# Patient Record
Sex: MALE | Race: BLACK OR AFRICAN AMERICAN | Employment: UNEMPLOYED | URBAN - NONMETROPOLITAN AREA
[De-identification: names, ages, dates, MRNs, and addresses within clinical notes are randomized per-mention and may not be internally consistent; named-entity substitution may affect disease eponyms.]

---

## 2020-08-15 ENCOUNTER — HOSPITAL ENCOUNTER (EMERGENCY)
Age: 25
Discharge: LEFT BEFORE DISCHARGE | End: 2020-08-15
Payer: MEDICAID

## 2022-01-01 ENCOUNTER — APPOINTMENT (OUTPATIENT)
Dept: CT IMAGING | Facility: HOSPITAL | Age: 27
End: 2022-01-01

## 2022-01-01 ENCOUNTER — HOSPITAL ENCOUNTER (EMERGENCY)
Facility: HOSPITAL | Age: 27
Discharge: HOME | End: 2022-01-01
Attending: EMERGENCY MEDICINE | Admitting: EMERGENCY MEDICINE

## 2022-01-01 ENCOUNTER — APPOINTMENT (OUTPATIENT)
Dept: MRI IMAGING | Facility: HOSPITAL | Age: 27
End: 2022-01-01

## 2022-01-01 VITALS
RESPIRATION RATE: 18 BRPM | HEIGHT: 72 IN | TEMPERATURE: 98.8 F | OXYGEN SATURATION: 100 % | WEIGHT: 160 LBS | DIASTOLIC BLOOD PRESSURE: 83 MMHG | SYSTOLIC BLOOD PRESSURE: 114 MMHG | BODY MASS INDEX: 21.67 KG/M2 | HEART RATE: 78 BPM

## 2022-01-01 DIAGNOSIS — R40.4 TRANSIENT ALTERATION OF AWARENESS: Primary | ICD-10-CM

## 2022-01-01 LAB
ALBUMIN SERPL-MCNC: 4.7 G/DL (ref 3.5–5.2)
ALBUMIN/GLOB SERPL: 1.3 G/DL
ALP SERPL-CCNC: 68 U/L (ref 39–117)
ALT SERPL W P-5'-P-CCNC: 21 U/L (ref 1–41)
AMPHET+METHAMPHET UR QL: NEGATIVE
AMPHETAMINES UR QL: NEGATIVE
ANION GAP SERPL CALCULATED.3IONS-SCNC: 14 MMOL/L (ref 5–15)
APAP SERPL-MCNC: <5 MCG/ML (ref 0–30)
AST SERPL-CCNC: 39 U/L (ref 1–40)
BARBITURATES UR QL SCN: NEGATIVE
BASOPHILS # BLD AUTO: 0.03 10*3/MM3 (ref 0–0.2)
BASOPHILS NFR BLD AUTO: 0.4 % (ref 0–1.5)
BENZODIAZ UR QL SCN: POSITIVE
BILIRUB SERPL-MCNC: 0.9 MG/DL (ref 0–1.2)
BUN SERPL-MCNC: 9 MG/DL (ref 6–20)
BUN/CREAT SERPL: 9.5 (ref 7–25)
BUPRENORPHINE SERPL-MCNC: NEGATIVE NG/ML
CALCIUM SPEC-SCNC: 9.5 MG/DL (ref 8.6–10.5)
CANNABINOIDS SERPL QL: NEGATIVE
CHLORIDE SERPL-SCNC: 102 MMOL/L (ref 98–107)
CO2 SERPL-SCNC: 24 MMOL/L (ref 22–29)
COCAINE UR QL: NEGATIVE
CREAT SERPL-MCNC: 0.95 MG/DL (ref 0.76–1.27)
DEPRECATED RDW RBC AUTO: 40.4 FL (ref 37–54)
EOSINOPHIL # BLD AUTO: 0.14 10*3/MM3 (ref 0–0.4)
EOSINOPHIL NFR BLD AUTO: 2 % (ref 0.3–6.2)
ERYTHROCYTE [DISTWIDTH] IN BLOOD BY AUTOMATED COUNT: 13.9 % (ref 12.3–15.4)
ETHANOL UR QL: <0.01 %
GFR SERPL CREATININE-BSD FRML MDRD: 115 ML/MIN/1.73
GLOBULIN UR ELPH-MCNC: 3.5 GM/DL
GLUCOSE SERPL-MCNC: 85 MG/DL (ref 65–99)
HCT VFR BLD AUTO: 40.6 % (ref 37.5–51)
HGB BLD-MCNC: 14.2 G/DL (ref 13–17.7)
HOLD SPECIMEN: NORMAL
IMM GRANULOCYTES # BLD AUTO: 0.01 10*3/MM3 (ref 0–0.05)
IMM GRANULOCYTES NFR BLD AUTO: 0.1 % (ref 0–0.5)
LYMPHOCYTES # BLD AUTO: 2.19 10*3/MM3 (ref 0.7–3.1)
LYMPHOCYTES NFR BLD AUTO: 31.7 % (ref 19.6–45.3)
MAGNESIUM SERPL-MCNC: 2.2 MG/DL (ref 1.6–2.6)
MCH RBC QN AUTO: 28.5 PG (ref 26.6–33)
MCHC RBC AUTO-ENTMCNC: 35 G/DL (ref 31.5–35.7)
MCV RBC AUTO: 81.4 FL (ref 79–97)
METHADONE UR QL SCN: NEGATIVE
MONOCYTES # BLD AUTO: 0.95 10*3/MM3 (ref 0.1–0.9)
MONOCYTES NFR BLD AUTO: 13.8 % (ref 5–12)
NEUTROPHILS NFR BLD AUTO: 3.58 10*3/MM3 (ref 1.7–7)
NEUTROPHILS NFR BLD AUTO: 52 % (ref 42.7–76)
NRBC BLD AUTO-RTO: 0 /100 WBC (ref 0–0.2)
OPIATES UR QL: NEGATIVE
OXYCODONE UR QL SCN: NEGATIVE
PCP UR QL SCN: NEGATIVE
PLATELET # BLD AUTO: 245 10*3/MM3 (ref 140–450)
PMV BLD AUTO: 9.3 FL (ref 6–12)
POTASSIUM SERPL-SCNC: 4.6 MMOL/L (ref 3.5–5.2)
PROPOXYPH UR QL: NEGATIVE
PROT SERPL-MCNC: 8.2 G/DL (ref 6–8.5)
RBC # BLD AUTO: 4.99 10*6/MM3 (ref 4.14–5.8)
SALICYLATES SERPL-MCNC: <0.3 MG/DL
SARS-COV-2 RNA PNL SPEC NAA+PROBE: NOT DETECTED
SODIUM SERPL-SCNC: 140 MMOL/L (ref 136–145)
TRICYCLICS UR QL SCN: NEGATIVE
WBC NRBC COR # BLD: 6.9 10*3/MM3 (ref 3.4–10.8)
WHOLE BLOOD HOLD SPECIMEN: NORMAL
WHOLE BLOOD HOLD SPECIMEN: NORMAL

## 2022-01-01 PROCEDURE — 0 GADOBENATE DIMEGLUMINE 529 MG/ML SOLUTION: Performed by: EMERGENCY MEDICINE

## 2022-01-01 PROCEDURE — 93010 ELECTROCARDIOGRAM REPORT: CPT | Performed by: INTERNAL MEDICINE

## 2022-01-01 PROCEDURE — 96374 THER/PROPH/DIAG INJ IV PUSH: CPT

## 2022-01-01 PROCEDURE — 36415 COLL VENOUS BLD VENIPUNCTURE: CPT

## 2022-01-01 PROCEDURE — 82077 ASSAY SPEC XCP UR&BREATH IA: CPT | Performed by: EMERGENCY MEDICINE

## 2022-01-01 PROCEDURE — 83735 ASSAY OF MAGNESIUM: CPT | Performed by: EMERGENCY MEDICINE

## 2022-01-01 PROCEDURE — 25010000002 LORAZEPAM PER 2 MG: Performed by: EMERGENCY MEDICINE

## 2022-01-01 PROCEDURE — 70553 MRI BRAIN STEM W/O & W/DYE: CPT

## 2022-01-01 PROCEDURE — 93005 ELECTROCARDIOGRAM TRACING: CPT | Performed by: EMERGENCY MEDICINE

## 2022-01-01 PROCEDURE — 80053 COMPREHEN METABOLIC PANEL: CPT | Performed by: EMERGENCY MEDICINE

## 2022-01-01 PROCEDURE — 80306 DRUG TEST PRSMV INSTRMNT: CPT | Performed by: EMERGENCY MEDICINE

## 2022-01-01 PROCEDURE — 85025 COMPLETE CBC W/AUTO DIFF WBC: CPT | Performed by: EMERGENCY MEDICINE

## 2022-01-01 PROCEDURE — 70450 CT HEAD/BRAIN W/O DYE: CPT

## 2022-01-01 PROCEDURE — 80143 DRUG ASSAY ACETAMINOPHEN: CPT | Performed by: EMERGENCY MEDICINE

## 2022-01-01 PROCEDURE — A9577 INJ MULTIHANCE: HCPCS | Performed by: EMERGENCY MEDICINE

## 2022-01-01 PROCEDURE — 99284 EMERGENCY DEPT VISIT MOD MDM: CPT

## 2022-01-01 PROCEDURE — 80179 DRUG ASSAY SALICYLATE: CPT | Performed by: EMERGENCY MEDICINE

## 2022-01-01 PROCEDURE — 87635 SARS-COV-2 COVID-19 AMP PRB: CPT | Performed by: EMERGENCY MEDICINE

## 2022-01-01 RX ORDER — SODIUM CHLORIDE 0.9 % (FLUSH) 0.9 %
10 SYRINGE (ML) INJECTION AS NEEDED
Status: DISCONTINUED | OUTPATIENT
Start: 2022-01-01 | End: 2022-01-02 | Stop reason: HOSPADM

## 2022-01-01 RX ORDER — LORAZEPAM 2 MG/ML
1 INJECTION INTRAMUSCULAR ONCE
Status: COMPLETED | OUTPATIENT
Start: 2022-01-01 | End: 2022-01-01

## 2022-01-01 RX ADMIN — LORAZEPAM 1 MG: 2 INJECTION INTRAMUSCULAR; INTRAVENOUS at 20:51

## 2022-01-01 RX ADMIN — GADOBENATE DIMEGLUMINE 14 ML: 529 INJECTION, SOLUTION INTRAVENOUS at 21:39

## 2022-01-02 NOTE — ED PROVIDER NOTES
"Subjective   Patient is brought to emergency room by ambulance with a report from the EMS crew that they were called at the scene by his mother and grandmother with a report that the patient was unresponsive but breathing.  That is what EMS found there.  They do not know how he got this way underwent on how long he was like that.  The mother and grandmother told him that he may have taken some ambulate no him taking anything.  They said the patient responds to them with painful stimuli by briefly opening his eyes and sometimes \"lock up\" but they have not actually seen any seizures.  The family also told EMS that patient has had a panic attack in the past.      History provided by:  Patient   used: No    Altered Mental Status  Presenting symptoms: unresponsiveness    Severity:  Severe  Most recent episode:  Today  Episode history:  Single  Duration: uncertain.  Timing:  Constant  Progression:  Partially resolved  Chronicity:  New  Context: drug use    Context: not alcohol use, not dementia, not head injury, not homeless, taking medications as prescribed, not nursing home resident, not recent change in medication, not recent illness and not recent infection    Associated symptoms: abnormal movement    Associated symptoms: no abdominal pain, no agitation, no bladder incontinence, no decreased appetite, no depression, no difficulty breathing, no eye deviation, no fever, no hallucinations, no headaches, no light-headedness, no nausea, no palpitations, no rash, no seizures, no slurred speech, no suicidal behavior, no visual change, no vomiting and no weakness        Review of Systems   Unable to perform ROS: Mental status change   Constitutional: Negative for decreased appetite and fever.   Cardiovascular: Negative for palpitations.   Gastrointestinal: Negative for abdominal pain, nausea and vomiting.   Genitourinary: Negative for bladder incontinence.   Skin: Negative for rash.   Neurological: " Negative for seizures, weakness, light-headedness and headaches.   Psychiatric/Behavioral: Negative for agitation and hallucinations.   All other systems reviewed and are negative.      History reviewed. No pertinent past medical history.    No Known Allergies    History reviewed. No pertinent surgical history.    History reviewed. No pertinent family history.    Social History     Socioeconomic History   • Marital status: Single       Prior to Admission medications    Not on File       Medications   sodium chloride 0.9 % flush 10 mL (has no administration in time range)   LORazepam (ATIVAN) injection 1 mg (1 mg Intravenous Given 1/1/22 2051)   gadobenate dimeglumine (MULTIHANCE) injection 14 mL (14 mL Intravenous Given 1/1/22 2139)       Vitals:    01/01/22 2316   BP: 114/83   Pulse: 78   Resp: 18   Temp: 98.8 °F (37.1 °C)   SpO2: 100%         Objective   Physical Exam  Vitals and nursing note reviewed.   Constitutional:       Appearance: Normal appearance.   HENT:      Head: Normocephalic and atraumatic.      Right Ear: Tympanic membrane normal.      Left Ear: Tympanic membrane normal.      Nose: Nose normal.      Mouth/Throat:      Pharynx: Oropharynx is clear.      Comments: Patient does have an apparent burn blister on the right side of his lower lip.  Eyes:      Extraocular Movements: Extraocular movements intact.      Pupils: Pupils are equal, round, and reactive to light.   Cardiovascular:      Rate and Rhythm: Normal rate and regular rhythm.   Pulmonary:      Effort: Pulmonary effort is normal.      Breath sounds: Normal breath sounds.   Abdominal:      General: Abdomen is flat.      Palpations: Abdomen is soft.   Musculoskeletal:         General: Normal range of motion.      Cervical back: Normal range of motion and neck supple.   Skin:     General: Skin is warm and dry.      Capillary Refill: Capillary refill takes less than 2 seconds.   Neurological:      General: No focal deficit present.      Comments:  Patient arouses with a sternal rub and will look at she will not answer any questions.  He has no focal limitations or deficits.   Psychiatric:      Comments: Unresponsive to questioning.         Procedures         Lab Results (last 24 hours)     Procedure Component Value Units Date/Time    Acetaminophen Level [[id number]]  (Normal) Collected: 01/01/22 1851    Specimen: Blood Updated: 01/01/22 1946     Acetaminophen <5.0 mcg/mL     CBC & Differential [[id number]]  (Abnormal) Collected: 01/01/22 1851    Specimen: Blood Updated: 01/01/22 1909    Narrative:      The following orders were created for panel order CBC & Differential.  Procedure                               Abnormality         Status                     ---------                               -----------         ------                     CBC Auto Differential[[id number]]        Abnormal            Final result                 Please view results for these tests on the individual orders.    Comprehensive Metabolic Panel [[id number]] Collected: 01/01/22 1851    Specimen: Blood Updated: 01/01/22 1942     Glucose 85 mg/dL      BUN 9 mg/dL      Creatinine 0.95 mg/dL      Sodium 140 mmol/L      Potassium 4.6 mmol/L      Chloride 102 mmol/L      CO2 24.0 mmol/L      Calcium 9.5 mg/dL      Total Protein 8.2 g/dL      Albumin 4.70 g/dL      ALT (SGPT) 21 U/L      AST (SGOT) 39 U/L      Alkaline Phosphatase 68 U/L      Total Bilirubin 0.9 mg/dL      eGFR  African Amer 115 mL/min/1.73      Globulin 3.5 gm/dL      A/G Ratio 1.3 g/dL      BUN/Creatinine Ratio 9.5     Anion Gap 14.0 mmol/L     Narrative:      GFR Normal >60  Chronic Kidney Disease <60  Kidney Failure <15      Magnesium [[id number]]  (Normal) Collected: 01/01/22 1851    Specimen: Blood Updated: 01/01/22 1930     Magnesium 2.2 mg/dL     Ethanol [[id number]] Collected: 01/01/22 1851    Specimen: Blood Updated: 01/01/22 1920     Ethanol % <0.010 %     Narrative:      Not for legal purposes. Chain of Custody not  followed.     Salicylate Level [[id number]]  (Normal) Collected: 01/01/22 1851    Specimen: Blood Updated: 01/01/22 1946     Salicylate <0.3 mg/dL     CBC Auto Differential [[id number]]  (Abnormal) Collected: 01/01/22 1851    Specimen: Blood Updated: 01/01/22 1909     WBC 6.90 10*3/mm3      RBC 4.99 10*6/mm3      Hemoglobin 14.2 g/dL      Hematocrit 40.6 %      MCV 81.4 fL      MCH 28.5 pg      MCHC 35.0 g/dL      RDW 13.9 %      RDW-SD 40.4 fl      MPV 9.3 fL      Platelets 245 10*3/mm3      Neutrophil % 52.0 %      Lymphocyte % 31.7 %      Monocyte % 13.8 %      Eosinophil % 2.0 %      Basophil % 0.4 %      Immature Grans % 0.1 %      Neutrophils, Absolute 3.58 10*3/mm3      Lymphocytes, Absolute 2.19 10*3/mm3      Monocytes, Absolute 0.95 10*3/mm3      Eosinophils, Absolute 0.14 10*3/mm3      Basophils, Absolute 0.03 10*3/mm3      Immature Grans, Absolute 0.01 10*3/mm3      nRBC 0.0 /100 WBC     COVID PRE-OP / PRE-PROCEDURE SCREENING ORDER (NO ISOLATION) - Swab, Nasal Cavity [[id number]]  (Normal) Collected: 01/01/22 1917    Specimen: Swab from Nasal Cavity Updated: 01/01/22 2005    Narrative:      The following orders were created for panel order COVID PRE-OP / PRE-PROCEDURE SCREENING ORDER (NO ISOLATION) - Swab, Nasal Cavity.  Procedure                               Abnormality         Status                     ---------                               -----------         ------                     COVID-19,[facility] IN-HOUS...[[id number]]  Normal              Final result                 Please view results for these tests on the individual orders.    COVID-19,[facility] IN-HOUSE,Nasal Swab No Transport Media 3-4 HR TAT - Swab, Nasal Cavity [[id number]]  (Normal) Collected: 01/01/22 1917    Specimen: Swab from Nasal Cavity Updated: 01/01/22 2005     COVID19 Not Detected    Narrative:      Fact sheet for providers: https://www.fda.gov/media/361692/download     Fact sheet for patients:  https://www.fda.gov/media/682007/download    Test performed by PCR.    Consider negative results in combination with clinical observations, patient history, and epidemiological information.    Urine Drug Screen - Urine, Clean Catch [[id number]]  (Abnormal) Collected: 01/01/22 1945    Specimen: Urine, Clean Catch Updated: 01/01/22 2008     THC, Screen, Urine Negative     Phencyclidine (PCP), Urine Negative     Cocaine Screen, Urine Negative     Methamphetamine, Ur Negative     Opiate Screen Negative     Amphetamine Screen, Urine Negative     Benzodiazepine Screen, Urine Positive     Tricyclic Antidepressants Screen Negative     Methadone Screen, Urine Negative     Barbiturates Screen, Urine Negative     Oxycodone Screen, Urine Negative     Propoxyphene Screen Negative     Buprenorphine, Screen, Urine Negative    Narrative:      Cutoff For Drugs Screened:    Amphetamines               500 ng/ml  Barbiturates               200 ng/ml  Benzodiazepines            150 ng/ml  Cocaine                    150 ng/ml  Methadone                  200 ng/ml  Opiates                    100 ng/ml  Phencyclidine               25 ng/ml  THC                            50 ng/ml  Methamphetamine            500 ng/ml  Tricyclic Antidepressants  300 ng/ml  Oxycodone                  100 ng/ml  Propoxyphene               300 ng/ml  Buprenorphine               10 ng/ml    The normal value for all drugs tested is negative. This report includes unconfirmed screening results, with the cutoff values listed, to be used for medical treatment purposes only.  Unconfirmed results must not be used for non-medical purposes such as employment or legal testing.  Clinical consideration should be applied to any drug of abuse test, particularly when unconfirmed results are used.            MRI Brain With & Without Contrast   Final Result   1. No acute intracranial abnormality. No acute signs of ischemia,   hemorrhage, mass. No abnormal intracranial  enhancement.   This report was finalized on 01/01/2022 21:51 by Dr. Talita Bullard MD.      CT Head Without Contrast   Final Result   1. No acute intracranial abnormality identified.   This report was finalized on 01/01/2022 19:41 by Dr. Talita Bullard MD.          ED Course  ED Course as of 01/01/22 2352   Sat Jan 01, 2022   1941 Patient stepmother is here now.  She actually says that they have been seeing behavior from this patient for the past 4 to 5 days.  When I asked about the behavior she said he just stares offand does not communicate with anybody.  In the last couple days they have noticed this drawing up motion or clenching motion that would last for a few seconds that she says seems like seizures but on last for a few seconds and then resolves.  They do not know of any new injuries or illnesses.  They did notice a blister on his lip and they say it has swollen and Trancot every times but he has not gotten checked on.  She says he was taken by the police to long term on December 30 for what was a charge of intoxication and disorderly conduct patient did not know what his behavior was that prompted that from the police.  She does not think the patient drinks.  She wonders if he may have taken something or was given something by somebody else.  He she does say that once before he got a possible diagnosis of schizophrenia but is currently not being treated for anything.  She also says that she has seen so that he might be hallucinating but by that she just needs to staring off into space but no sign that he is talking to somebody that is not there or picking at things in the ear.` [TR]   1959 Patient stepmother now also tells me that the grandmother that the patient lives with told her that the patient last year was in the emergency room for an evaluation for possible mental breakdown in the [place] try to put him in the hospital and when the grandmother left he left also.  When he looked at the patient's  digital record that apparently was at [facility] in August of last year. [TR]   2300 I told the patient his testing was all negative at the present time.  His grandmother is now in the room with him.  She said that he has back to his normal self at the present time.  Patient says at times he just blocks everything out and not want to talk anybody.  I pointed out to him he needs to get some help with that because this has been bad enough that is very concerning to his family.  He did says he was not aware that he was in [facility] overnight because the police thought he was intoxicated even though he was not.  I also noticed that his drug screen was positive for benzodiazepines but he has no idea how that came to be.  At any rate he is now back to his normal self so I think this is some type of mental health disorder and the need to follow-up with an appropriate specialist.  Grandmother says she will take him if the patient will go.  He is discharged in stable condition.  There is no signs of encephalitis or seizure disorder anything.  All symptoms have cleared at the present time. [TR]      ED Course User Index  [TR] Gustavo Woody Jr., MD          MDM  Number of Diagnoses or Management Options  Transient alteration of awareness: new and requires workup     Amount and/or Complexity of Data Reviewed  Clinical lab tests: ordered and reviewed  Tests in the radiology section of CPT®: ordered and reviewed  Tests in the medicine section of CPT®: ordered and reviewed    Risk of Complications, Morbidity, and/or Mortality  Presenting problems: moderate  Diagnostic procedures: moderate  Management options: moderate        Final diagnoses:   Transient alteration of awareness          Gustavo Woody Jr., MD  01/01/22 6998

## 2022-01-03 LAB
QT INTERVAL: 388 MS
QTC INTERVAL: 433 MS

## 2022-01-04 ENCOUNTER — HOSPITAL ENCOUNTER (INPATIENT)
Age: 27
LOS: 3 days | Discharge: HOME | DRG: 885 | End: 2022-01-07
Attending: PSYCHIATRY & NEUROLOGY | Admitting: PSYCHIATRY & NEUROLOGY
Payer: MEDICAID

## 2022-01-04 DIAGNOSIS — F29 PSYCHOSIS, UNSPECIFIED PSYCHOSIS TYPE (HCC): Primary | ICD-10-CM

## 2022-01-04 PROBLEM — F32.A DEPRESSION WITH SUICIDAL IDEATION: Status: ACTIVE | Noted: 2022-01-04

## 2022-01-04 PROBLEM — R45.851 DEPRESSION WITH SUICIDAL IDEATION: Status: ACTIVE | Noted: 2022-01-04

## 2022-01-04 LAB
ACETAMINOPHEN LEVEL: <15 UG/ML
ALBUMIN SERPL-MCNC: 4.4 G/DL (ref 3.5–5.2)
ALP BLD-CCNC: 58 U/L (ref 40–130)
ALT SERPL-CCNC: 28 U/L (ref 5–41)
AMPHETAMINE SCREEN, URINE: NEGATIVE
ANION GAP SERPL CALCULATED.3IONS-SCNC: 10 MMOL/L (ref 7–19)
AST SERPL-CCNC: 74 U/L (ref 5–40)
BARBITURATE SCREEN URINE: NEGATIVE
BASOPHILS ABSOLUTE: 0 K/UL (ref 0–0.2)
BASOPHILS RELATIVE PERCENT: 0.5 % (ref 0–1)
BENZODIAZEPINE SCREEN, URINE: NEGATIVE
BILIRUB SERPL-MCNC: <0.2 MG/DL (ref 0.2–1.2)
BILIRUBIN URINE: NEGATIVE
BLOOD, URINE: NEGATIVE
BUN BLDV-MCNC: 11 MG/DL (ref 6–20)
CALCIUM SERPL-MCNC: 9 MG/DL (ref 8.6–10)
CANNABINOID SCREEN URINE: NEGATIVE
CHLORIDE BLD-SCNC: 103 MMOL/L (ref 98–111)
CLARITY: ABNORMAL
CO2: 26 MMOL/L (ref 22–29)
COCAINE METABOLITE SCREEN URINE: NEGATIVE
COLOR: YELLOW
CREAT SERPL-MCNC: 0.9 MG/DL (ref 0.5–1.2)
EOSINOPHILS ABSOLUTE: 0.1 K/UL (ref 0–0.6)
EOSINOPHILS RELATIVE PERCENT: 1.7 % (ref 0–5)
ETHANOL: <10 MG/DL (ref 0–0.08)
GFR AFRICAN AMERICAN: >59
GFR NON-AFRICAN AMERICAN: >60
GLUCOSE BLD-MCNC: 62 MG/DL (ref 74–109)
GLUCOSE URINE: NEGATIVE MG/DL
HCT VFR BLD CALC: 38.2 % (ref 42–52)
HEMOGLOBIN: 12.4 G/DL (ref 14–18)
IMMATURE GRANULOCYTES #: 0 K/UL
KETONES, URINE: ABNORMAL MG/DL
LEUKOCYTE ESTERASE, URINE: NEGATIVE
LYMPHOCYTES ABSOLUTE: 1.8 K/UL (ref 1.1–4.5)
LYMPHOCYTES RELATIVE PERCENT: 43.3 % (ref 20–40)
Lab: NORMAL
MCH RBC QN AUTO: 27.8 PG (ref 27–31)
MCHC RBC AUTO-ENTMCNC: 32.5 G/DL (ref 33–37)
MCV RBC AUTO: 85.7 FL (ref 80–94)
MONOCYTES ABSOLUTE: 0.5 K/UL (ref 0–0.9)
MONOCYTES RELATIVE PERCENT: 11 % (ref 0–10)
NEUTROPHILS ABSOLUTE: 1.8 K/UL (ref 1.5–7.5)
NEUTROPHILS RELATIVE PERCENT: 43.3 % (ref 50–65)
NITRITE, URINE: NEGATIVE
OPIATE SCREEN URINE: NEGATIVE
PDW BLD-RTO: 13.7 % (ref 11.5–14.5)
PH UA: 6 (ref 5–8)
PLATELET # BLD: 238 K/UL (ref 130–400)
PMV BLD AUTO: 8.9 FL (ref 9.4–12.4)
POTASSIUM SERPL-SCNC: 3.9 MMOL/L (ref 3.5–5)
PROTEIN UA: NEGATIVE MG/DL
RBC # BLD: 4.46 M/UL (ref 4.7–6.1)
SALICYLATE, SERUM: <3 MG/DL (ref 3–10)
SARS-COV-2, NAAT: NOT DETECTED
SODIUM BLD-SCNC: 139 MMOL/L (ref 136–145)
SPECIFIC GRAVITY UA: 1.03 (ref 1–1.03)
TOTAL PROTEIN: 6.9 G/DL (ref 6.6–8.7)
UROBILINOGEN, URINE: 1 E.U./DL
WBC # BLD: 4.2 K/UL (ref 4.8–10.8)

## 2022-01-04 PROCEDURE — 80307 DRUG TEST PRSMV CHEM ANLYZR: CPT

## 2022-01-04 PROCEDURE — 80053 COMPREHEN METABOLIC PANEL: CPT

## 2022-01-04 PROCEDURE — 87635 SARS-COV-2 COVID-19 AMP PRB: CPT

## 2022-01-04 PROCEDURE — 36415 COLL VENOUS BLD VENIPUNCTURE: CPT

## 2022-01-04 PROCEDURE — 82077 ASSAY SPEC XCP UR&BREATH IA: CPT

## 2022-01-04 PROCEDURE — 99284 EMERGENCY DEPT VISIT MOD MDM: CPT

## 2022-01-04 PROCEDURE — 1240000000 HC EMOTIONAL WELLNESS R&B

## 2022-01-04 PROCEDURE — 80179 DRUG ASSAY SALICYLATE: CPT

## 2022-01-04 PROCEDURE — 80143 DRUG ASSAY ACETAMINOPHEN: CPT

## 2022-01-04 PROCEDURE — 81003 URINALYSIS AUTO W/O SCOPE: CPT

## 2022-01-04 PROCEDURE — 85025 COMPLETE CBC W/AUTO DIFF WBC: CPT

## 2022-01-04 RX ORDER — POLYETHYLENE GLYCOL 3350 17 G/17G
17 POWDER, FOR SOLUTION ORAL DAILY PRN
Status: DISCONTINUED | OUTPATIENT
Start: 2022-01-04 | End: 2022-01-07 | Stop reason: HOSPADM

## 2022-01-04 RX ORDER — ACETAMINOPHEN 325 MG/1
650 TABLET ORAL EVERY 4 HOURS PRN
Status: DISCONTINUED | OUTPATIENT
Start: 2022-01-04 | End: 2022-01-07 | Stop reason: HOSPADM

## 2022-01-04 RX ORDER — MECOBALAMIN 5000 MCG
5 TABLET,DISINTEGRATING ORAL NIGHTLY
Status: DISCONTINUED | OUTPATIENT
Start: 2022-01-05 | End: 2022-01-06

## 2022-01-04 RX ORDER — RISPERIDONE 1 MG/1
2 TABLET, FILM COATED ORAL NIGHTLY PRN
Status: DISCONTINUED | OUTPATIENT
Start: 2022-01-04 | End: 2022-01-05

## 2022-01-04 RX ORDER — TRAZODONE HYDROCHLORIDE 50 MG/1
50 TABLET ORAL NIGHTLY
Status: DISCONTINUED | OUTPATIENT
Start: 2022-01-05 | End: 2022-01-05

## 2022-01-04 ASSESSMENT — SLEEP AND FATIGUE QUESTIONNAIRES
DO YOU HAVE DIFFICULTY SLEEPING: YES
SLEEP PATTERN: DIFFICULTY FALLING ASLEEP
DO YOU USE A SLEEP AID: NO
DIFFICULTY STAYING ASLEEP: YES
DIFFICULTY ARISING: YES
DIFFICULTY FALLING ASLEEP: YES
RESTFUL SLEEP: NO

## 2022-01-04 ASSESSMENT — LIFESTYLE VARIABLES: HISTORY_ALCOHOL_USE: NO

## 2022-01-04 ASSESSMENT — PAIN DESCRIPTION - DESCRIPTORS: DESCRIPTORS: ACHING

## 2022-01-04 ASSESSMENT — PATIENT HEALTH QUESTIONNAIRE - PHQ9: SUM OF ALL RESPONSES TO PHQ QUESTIONS 1-9: 18

## 2022-01-04 ASSESSMENT — PAIN DESCRIPTION - FREQUENCY: FREQUENCY: CONTINUOUS

## 2022-01-04 ASSESSMENT — PAIN DESCRIPTION - LOCATION: LOCATION: MOUTH

## 2022-01-04 ASSESSMENT — PAIN DESCRIPTION - PAIN TYPE: TYPE: ACUTE PAIN

## 2022-01-04 ASSESSMENT — PAIN SCALES - GENERAL: PAINLEVEL_OUTOF10: 4

## 2022-01-05 PROBLEM — F29 PSYCHOSIS (HCC): Status: ACTIVE | Noted: 2022-01-05

## 2022-01-05 PROCEDURE — 6370000000 HC RX 637 (ALT 250 FOR IP): Performed by: PSYCHIATRY & NEUROLOGY

## 2022-01-05 PROCEDURE — 99223 1ST HOSP IP/OBS HIGH 75: CPT | Performed by: PSYCHIATRY & NEUROLOGY

## 2022-01-05 PROCEDURE — 1240000000 HC EMOTIONAL WELLNESS R&B

## 2022-01-05 RX ORDER — RISPERIDONE 0.5 MG/1
0.5 TABLET, FILM COATED ORAL NIGHTLY
Status: DISCONTINUED | OUTPATIENT
Start: 2022-01-05 | End: 2022-01-06

## 2022-01-05 RX ORDER — TRAZODONE HYDROCHLORIDE 50 MG/1
50 TABLET ORAL NIGHTLY PRN
Status: DISCONTINUED | OUTPATIENT
Start: 2022-01-05 | End: 2022-01-07 | Stop reason: HOSPADM

## 2022-01-05 RX ADMIN — RISPERIDONE 0.5 MG: 0.5 TABLET ORAL at 21:53

## 2022-01-05 RX ADMIN — TRAZODONE HYDROCHLORIDE 50 MG: 50 TABLET ORAL at 00:05

## 2022-01-05 RX ADMIN — Medication 5 MG: at 00:05

## 2022-01-05 RX ADMIN — TRAZODONE HYDROCHLORIDE 50 MG: 50 TABLET ORAL at 21:53

## 2022-01-05 NOTE — H&P
Department of Psychiatry  Attending History and Physical        CHIEF COMPLAINT:  \"I'm fine\"    History obtained from: patient, chart    HISTORY OF PRESENT ILLNESS:    32 yo AA male admitted due to disorganized behavior. Family friend stated he has not been the same, he cannot work and sometimes shakes all over. Sometimes he will not respond. His brother committed suicide in 2020. UDS negative. Patient is observed resting in bed this morning. He is withdrawn and presents with psychomotor retardation. Provides short answers to questions. His responses are delayed. States he slept \"a little bit. \"  He denies suicidal ideation. States he had suicidal thoughts previously, unable to recall the last time he had them. He is a poor historian and does not offer much information. States he has been stressed out due to some family issues. Claims he is working at [employer]. He lives with his grandmother. He denies depression and anxiety. When asked about hallucinations, states \"it is difficult to describe\". Becomes tearful when asked about one of his tattoos. Turned away and stopped talking. PSYCHIATRIC HISTORY:    Diagnoses: Denies  Suicide attempts/gestures: Denies   Prior hospitalizations: Denies   Medication trials: Unable to recall  Mental health contact: Lost to follow-up   Head trauma: concussions    SUBSTANCE USE HISTORY:  Denies. Past Medical History:    Past Medical History:   Diagnosis Date    Kidney stone        Past Surgical History:    Past Surgical History:   Procedure Laterality Date    DENTAL SURGERY         Medications Prior to Admission:   Prior to Admission medications    Not on File       Allergies:  Patient has no known allergies. Social History:  Lives with grandmother.  ed. Working at [employer]. Family History:   Brother killed self in 2020. REVIEW OF SYSTEMS:  General: No fevers, chills, night sweats, no recent weight loss or gain.   Head: No headache, no migraine. Eyes: No recent visual changes. Ears: No recent hearing changes. Nose: No increased congestion or change in sense of smell. Throat: No sore throat, no trouble swallowing. Cardiovascular: No chest pain or palpitations, or dizziness. Respiratory: No cough, wheezes, congestion, or shortness of breath. Gastrointestinal: No abdominal pain, nausea or vomiting, no diarrhea or constipation. Musculo-skeletal: No edema, deformities, or loss of functions. Neurological: No loss of consciousness, abnormal sensations, or weakness. Skin: No rash, abrasions or bruises. PHYSICAL EXAM:  GENERAL APPEARANCE: 31y.o. year-old male in NAD   HEAD: Normocephalic, atraumatic. THROAT: No erythema, exudates, lesions. No tongue laceration. CARDIOVASCULAR: PMI nondisplaced. Regular rhythm and rate. Normal S1 and S2.  PULMONARY: Clear to auscultation bilaterally, no tenderness to palpation. ABDOMEN: Soft, nontender, nondistended. MUSCULOSKELTAL: No obvious deformities, clubbing, cyanosis or edema, no spinous process or paraspinous tenderness, normal ROM, distal pulses intact symmetric 2+ bilaterally. NEUROLOGICAL: Alert, oriented x 3, CN II-XII grossly intact, motor strength 5/5 all muscle groups, DTR 2+ intact and symmetric, sensation intact to sharp and dull. No abnormal movements or tremors. SKIN: Warm, dry, intact, no rash, abrasions bruises     Vitals:  /71   Pulse 53   Temp 97.2 °F (36.2 °C)   Resp 18   Ht 5' 9\" (1.753 m)   Wt 160 lb (72.6 kg)   SpO2 96%   BMI 23.63 kg/m²     Mental Status Examination:    Appearance: Stated age. Gait not assessed. No abnormal movements or tremor. Behavior: Withdrawn  Speech: Hypoverbal  Mood: \"Ok \"   Affect: Guarded  Thought Process: Appears linear. Thought Content: Denies SI/HI. Paranoia present. Perceptions: Denies auditory or visual hallucinations at present time. Not responding to internal stimuli. Concentration: Intact.    Orientation: to person, place, date, and situation. Language: Intact. Fund of information: Intact. Memory: Recent and remote appear intact. Neurovegitative: Poor appetite and sleep. Insight: Impaired. Judgment: Impaired. DATA:  Lab Results   Component Value Date    WBC 4.2 (L) 01/04/2022    HGB 12.4 (L) 01/04/2022    HCT 38.2 (L) 01/04/2022    MCV 85.7 01/04/2022     01/04/2022     Lab Results   Component Value Date     01/04/2022    K 3.9 01/04/2022     01/04/2022    CO2 26 01/04/2022    BUN 11 01/04/2022    CREATININE 0.9 01/04/2022    GLUCOSE 62 (L) 01/04/2022    CALCIUM 9.0 01/04/2022    PROT 6.9 01/04/2022    LABALBU 4.4 01/04/2022    BILITOT <0.2 01/04/2022    ALKPHOS 58 01/04/2022    AST 74 (H) 01/04/2022    ALT 28 01/04/2022    LABGLOM >60 01/04/2022    GFRAA >59 01/04/2022         ASSESSMENT AND PLAN:  DSM-5 DIAGNOSIS:   Impression:  Psychosis unspecified  R/o substance-induced psychosis  Insomnia unspecified    Patient is meeting the criteria for inpatient psychiatric treatment. Plan:   -Admit to HI Unit and monitor on 15 minute checks. Suicide precautions.  Lurene Az reviewed. -Gather collateral information from family with release.  -Medical monitoring to be performed by Dr. Jose Sandy and associates. Order routine labs. -Acclimate to the unit. Provide supportive psychotherapy.  -Encourage participation in groups and therapeutic activities as appropriate. Work on coping skills. -Medications:    Risperdal for psychotic features.   Pt was instructed regarding the risks and benefits of antipsychotic therapy including but not limited to Neuroleptic malignant syndrome (tetrad of distinctive clinical features: fever, rigidity, mental status changes, and autonomic instability), EPS (Akathisia, Parkinsonism, Tardive dyskinesia [repetitive movements of mouth, tongue, face, trunk, or extremities], nausea, constipation, abdominal pain, galactorrhea, gynecomastia, Dizziness, Sedation, Anticholinergic effects, Hypotension, Weight gain, DM, DSL, hyperglycemia, QTc prolongation. Additionally, in regards to tardive dyskinesia pt was instructed about increase risk of permanency of these movements. Trazodone PRN for sleep.    Offer nicotine patch to help with nicotine withdrawal.    -The risks, benefits, side effects, indications, contraindications, and adverse effects of the medications have been discussed.  -The patient has verbalized understanding and has capacity to give informed consent.  -SW help evaluate home environment and provide outpatient resources.  -Discuss with treatment team.

## 2022-01-05 NOTE — ED PROVIDER NOTES
[facility] 6 ADULT [facility]  eMERGENCY dEPARTMENT eNCOUnter      Pt Name: Chaz Russ  MRN: [id number]  [place] 1995  Date of evaluation: 1/4/2022  Provider: CORRINE Barry    CHIEF COMPLAINT       Chief Complaint   Patient presents with    Mental Health Problem         HISTORY OF PRESENT ILLNESS   (Location/Symptom, Timing/Onset,Context/Setting, Quality, Duration, Modifying Factors, Severity)  Note limiting factors. Chaz Russ is a 31 y.o. male who presents to the emergency department with mental health issues. Pt was catatonic for awhile recently. Was seen at [facility] and worked up. Told he needed mental health. Family friend says he is not the same. Says he can't work and sometimes shakes all over. Sometimes he just won't respond. His brother committed suicide in 2020. Previously he was told to see a psychiatrist but he never has. NO mental health meds. +SI. Not sleeping     The history is provided by the patient. Mental Health Problem  Presenting symptoms: bizarre behavior, depression and suicidal thoughts    Degree of incapacity (severity):  Severe  Onset quality:  Sudden  Duration:  1 week  Timing:  Constant  Progression:  Waxing and waning  Chronicity:  New  Context: stressful life event    Context: not alcohol use and not drug abuse    Associated symptoms: insomnia    Risk factors: no hx of suicide attempts        NursingNotes were reviewed. REVIEW OF SYSTEMS    (2-9 systems for level 4, 10 or more for level 5)     Review of Systems   Constitutional: Negative for fever. Neurological: Negative for facial asymmetry. Psychiatric/Behavioral: Positive for suicidal ideas. The patient has insomnia. Except as noted above the remainder of the review of systems was reviewed and negative.        PAST MEDICAL HISTORY     Past Medical History:   Diagnosis Date    Kidney stone          SURGICALHISTORY       Past Surgical History:   Procedure Laterality Date    DENTAL SURGERY           CURRENT MEDICATIONS     There are no discharge medications for this patient. ALLERGIES     Patient has no known allergies. FAMILY HISTORY     History reviewed. No pertinent family history. SOCIAL HISTORY       Social History     Socioeconomic History    Marital status: Single     Spouse name: None    Number of children: None    Years of education: None    Highest education level: None   Occupational History    None   Tobacco Use    Smoking status: Current Every Day Smoker     Packs/day: 0.50     Types: Cigarettes    Smokeless tobacco: Never Used   Substance and Sexual Activity    Alcohol use: Yes     Comment: occ    Drug use: Not Currently     Types: Marijuana Isaban Rylee)    Sexual activity: None   Other Topics Concern    None   Social History Narrative    None     Social Determinants of Health     Financial Resource Strain:     Difficulty of Paying Living Expenses: Not on file   Food Insecurity:     Worried About Running Out of Food in the Last Year: Not on file    Oz of Food in the Last Year: Not on file   Transportation Needs:     Lack of Transportation (Medical): Not on file    Lack of Transportation (Non-Medical):  Not on file   Physical Activity:     Days of Exercise per Week: Not on file    Minutes of Exercise per Session: Not on file   Stress:     Feeling of Stress : Not on file   Social Connections:     Frequency of Communication with Friends and Family: Not on file    Frequency of Social Gatherings with Friends and Family: Not on file    Attends Anabaptism Services: Not on file    Active Member of Clubs or Organizations: Not on file    Attends Club or Organization Meetings: Not on file    Marital Status: Not on file   Intimate Partner Violence:     Fear of Current or Ex-Partner: Not on file    Emotionally Abused: Not on file    Physically Abused: Not on file    Sexually Abused: Not on file   Housing Stability:     Unable to Pay for Housing in the Last Year: Not on file  Number of Places Lived in the Last Year: Not on file    Unstable Housing in the Last Year: Not on file       SCREENINGS    Edwin Coma Scale  Eye Opening: Spontaneous  Best Verbal Response: Oriented  Best Motor Response: Obeys commands  Edwin Coma Scale Score: 15 @FLOW([id number])@      PHYSICAL EXAM    (up to 7 for level 4, 8 or more for level 5)     ED Triage Vitals [01/04/22 2017]   BP Temp Temp Source Pulse Resp SpO2 Height Weight   121/70 98.2 °F (36.8 °C) Oral 82 18 97 % 5' 9\" (1.753 m) 160 lb (72.6 kg)       Physical Exam  Vitals and nursing note reviewed. Constitutional:       Appearance: He is well-developed. HENT:      Head: Normocephalic and atraumatic. Eyes:      General: No scleral icterus. Right eye: No discharge. Left eye: No discharge. Cardiovascular:      Rate and Rhythm: Regular rhythm. Bradycardia present. Heart sounds: Normal heart sounds. Pulmonary:      Effort: No respiratory distress. Breath sounds: Normal breath sounds. Abdominal:      Palpations: Abdomen is soft. Tenderness: There is no abdominal tenderness. Musculoskeletal:      Cervical back: Normal range of motion and neck supple. Skin:     General: Skin is warm. Neurological:      General: No focal deficit present. Mental Status: He is alert. Psychiatric:         Mood and Affect: Mood is depressed. Affect is flat. Speech: He is noncommunicative. Behavior: Behavior is withdrawn. Thought Content: Thought content includes suicidal ideation.          DIAGNOSTIC RESULTS     EKG: All EKG's are interpreted by the Emergency Department Physician who either signs or Co-signsthis chart in the absence of a cardiologist.        RADIOLOGY:   Non-plain filmimages such as CT, Ultrasound and MRI are read by the radiologist. Plain radiographic images are visualized and preliminarily interpreted by the emergency physician with the below findings:      Interpretation per the Radiologist below, if available at the time of this note:    No orders to display         ED BEDSIDEULTRASOUND:   Performed by ED Physician -none    LABS:  Labs Reviewed   COMPREHENSIVE METABOLIC PANEL - Abnormal; Notable for the following components:       Result Value    Glucose 62 (*)     AST 74 (*)     All other components within normal limits   CBC WITH AUTO DIFFERENTIAL - Abnormal; Notable for the following components:    WBC 4.2 (*)     RBC 4.46 (*)     Hemoglobin 12.4 (*)     Hematocrit 38.2 (*)     MCHC 32.5 (*)     MPV 8.9 (*)     Neutrophils % 43.3 (*)     Lymphocytes % 43.3 (*)     Monocytes % 11.0 (*)     All other components within normal limits   URINE RT REFLEX TO CULTURE - Abnormal; Notable for the following components:    Clarity, UA CLOUDY (*)     Ketones, Urine TRACE (*)     All other components within normal limits   COVID-19, RAPID   URINE DRUG SCREEN   ETHANOL   SALICYLATE LEVEL   ACETAMINOPHEN LEVEL       All other labs were within normal range or not returned as of this dictation. EMERGENCY DEPARTMENT COURSE and DIFFERENTIALDIAGNOSIS/MDM:   Vitals:    Vitals:    01/04/22 2017 01/04/22 2245 01/04/22 2334   BP: 121/70 118/69 108/79   Pulse: 82 79 50   Resp: 18 18 16   Temp: 98.2 °F (36.8 °C) 98 °F (36.7 °C) 97.7 °F (36.5 °C)   TempSrc: Oral Oral Temporal   SpO2: 97% 98% 99%   Weight: 160 lb (72.6 kg)     Height: 5' 9\" (1.753 m)             MDM  Dr Domi Suarez will admit      CONSULTS:  IP CONSULT TO INTERNAL MEDICINE    PROCEDURES:  Unless otherwise noted below, none     Procedures    FINAL IMPRESSION      1. Psychosis, unspecified psychosis type [facility])        DISPOSITION/PLAN   DISPOSITION Decision To Admit 01/04/2022 10:55:30 PM      PATIENT REFERRED TO:  No follow-up provider specified. DISCHARGE MEDICATIONS:  There are no discharge medications for this patient.          (Please note that portions of this note were completed with a voice recognitionprogram.  Efforts were made to edit the dictations but occasionally words are mis-transcribed.)    CORRINE Ivory (electronically signed)          CORRINE Ivory  01/05/22 8076

## 2022-01-05 NOTE — PROGRESS NOTES
[facility] Admission from ED  Nursing Admission Note        Reason for Admission: PT states reason for ED visit, \" Pt reports sleep deprivation, pt says he hasnt slept at all for 5 days. Pt had a brother pass away and says its really been bothering him. Pt states his brother killed himself in 2020 and suicide crosses his mind but he has things to live for. Pt then just goes blank when I am asking him things. Pt stares at nothing and wont follow commands or respond when I touch his arm. Per family Pt had an incident with police where they said he was intoxicated but he wasnt under the influence on dec 26. Pt friend says this is new behavior for past two weeks. Pt isnt able to work or drive and this is a change. Pt isnt able to follow commands. Pt is not on any medications. Pt seems to be blocking.  Pt was checked out medically at [facility] but came out  clear on January 1st. MRI,CT negative at [facility].    Patient Active Problem List   Diagnosis    Depression with suicidal ideation         Addictive Behavior:   Addictive Behavior  In the past 3 months, have you felt or has someone told you that you have a problem with:  : None  Do you have a history of Chemical Use?: No  Do you have a history of Alcohol Use?: No  Do you have a history of Street Drug Abuse?: No  Histroy of Prescripton Drug Abuse?: No    Medical Problems:   Past Medical History:   Diagnosis Date    Kidney stone        Status EXAM:  Status and Exam  Normal: No  Facial Expression: Flat  Affect: Constricted  Level of Consciousness: Lethargic  Mood:Normal: No  Mood: Depressed  Motor Activity:Normal: No  Motor Activity: Decreased  Interview Behavior: Uncooperative/Withdrawn  Preception: [place] to Situation,[place] to Place,[place] to Time  Attention:Normal: No  Attention: Unable to Concentrate  Thought Processes: Blocking  Thought Content:Normal: Yes (ROBERT)  Hallucinations: None  Delusions: No  Memory:Normal: No  Memory: Poor Recent  Insight and Judgment: No  Insight and Judgment: Poor Judgment,Poor Insight  Present Suicidal Ideation: No  Present Homicidal Ideation: No      Metabolic Screening:    No results found for: LABA1C  No results found for: CHOL  No results found for: TRIG  No results found for: HDL  No components found for: LDLCAL  No results found for: LABVLDL    Body mass index is 23.63 kg/m². BP Readings from Last 2 Encounters:   01/04/22 108/79   03/29/16 132/87       PATIENT STRENGTHS:  Strengths: Employment,Positive Support    Patient Strengths and Limitations:  Limitations: Difficult relationships / poor social skills      Tobacco Screening:  Practical Counseling, on admission, tammy X, if applicable and completed (first 3 are required if patient doesn't refuse):            Recognizing danger situations (included triggers and roadblocks)   Refused              Coping skills (new ways to manage stress, exercise, relaxation techniques, changing routine, distraction  Refused                                                 Basic information about quitting (benefits of quitting, techniques in how to quit, available resources Refused  Referral for counseling faxed to [facility]   Refused                                     Patient refused counseling Declined  Patient has not smoked in the last 30 days Yes  Patient offered nicotine patch. Declined  Patient is a smoker         Admission to Unit:    Pt admitted to [facility] under the care of Dr. Shorty Cruz, arrived on unit via [facility] with security and staff from ED  Patient arrived dressed in paper scrubs:  yes. Body assessment and safety check completed by Licensed staff X 2 and  no contraband discovered. Patient belongings and valuables was cataloged and accounted for by [address] [place] [address].      Admission completed by [facility] Nurse  Oriented to unit, unit policy and expectations:  yes    Reviewed and explained all legal documents:  yes    Education for Fall Prevention and Restraints given: yes    Patient signed all legal documents no   Pt verbalizes understanding:ROBERT Sifuentes Obtained? yes    Identifies stressors. ROBERT    COVID TEACHING: Nursing provided education regarding COVID for social distancing, wearing masks, washing hands, and reporting any symptoms: yes  Mask Provided: yes If patient refused, reason:       Admission Note:    Patient arrived to unit via wheelchair accompanied by ED staff and security. Patient with flat affect and non verbal at this time. He will shake his head yes or no in response to questions, with delay to response noted. Patient is hesitant and suspicious of staff at this time. Patient is oriented to unit and room.             Electronically signed by Gloria Alejandro LPN on 7/6/11 at 27:22 AM CST

## 2022-01-05 NOTE — PLAN OF CARE
Group Therapy Note    Date: 1/5/2022  Start Time: 1000  End Time:  1030  Number of Participants: 7    Type of Group: Psychoeducation    Wellness Binder Information  Module Name:  staying well  Session Number:  1    Patient's Goal:  daily maintenance and coping skills    Notes:  pt was verbally prompted to attend group. Pt refused. Information about staying well was provided. Status After Intervention:      Participation Level:     Participation Quality:       Speech:         Thought Process/Content:       Affective Functioning:       Mood:       Level of consciousness:        Response to Learning:       Endings:     Modes of Intervention:       Discipline Responsible: Psychoeducational Specialist      Signature:  Elaine Velazco

## 2022-01-05 NOTE — PROGRESS NOTES
[facility] ADULT INITIAL INTAKE ASSESSMENT     1/4/22    Yulissa Reynolds ,a 31 y.o. male, presents to the ED for a psychiatric assessment. ED Arrival time: 2100  ED physician: Toro Romo [facility] OF [facility] Notification time:   [facility] Assessment start time:   Psychiatrist call time:   Spoke with Dr. Yeimy Gilliam    Patient is referred by: car     Reason for visit to ED - Presenting problem:     PT states reason for ED visit, \" Pt reports sleep deprivation, pt says he hasnt slept at all for 5 days. Pt had a brother pass away and says its really been bothering him. Pt states his brother killed himself in 2020 and suicide crosses his mind but he has things to live for. Pt then just goes blank when I am asking him things. Pt stares at nothing and wont follow commands or respond when I touch his arm. Per family Pt had an incident with police where they said he was intoxicated but he wasnt under the influence on dec 26. Pt friend says this is new behavior for past two weeks. Pt isnt able to work or drive and this is a change. Pt isnt able to follow commands. Pt is not on any medications. Pt seems to be blocking.  Pt was checked out medically at [facility] [place], operated by [facility] but came out  clear on January 1st. MRI,CT negative at [facility].    Duration of symptoms: week     Current Stressors: family    C-SSRS Completed: yes    SI:  denies   Plan: no   Past SI attempts: no   If yes, when and how many times:  Describe suicide attempts:   HI: denies  If yes describe:   Delusions: denies  If yes describe:   Hallucinations: denies   If yes describe:   Risk of Harm to self: Self injurious/self mutilation behaviorsno   If yes explain:   Was it within the past 6 months: no   Risk of Harm to others: no   If yes explain:   Was it within the past 6 months: no   Trauma History:  Anxiety 1-10:  0  Explain if increased:   Depression 1-10:  0  Explain if increased:   Level of function outside hospital decreased: no   If yes explain:       Psychiatric Hospitalizations: No   Where & When: Outpatient Psychiatric Treatment:    Family History:    Family history of mental illness: no   \"Depression\",\"Anxiety\",\"Bipolar\",\"Schizophrenia\",\"Borderline\",\"ADHD\"}  Family members with suicide attempt: no   If yes explain (attempted or completed):    Substance Abuse History:     SBIRT Completed: yes  Brief Intervention completed if needed:  (Yes/No)    Current ETOH LEVELS:     ETOH Usage:     Amount drinking daily: denied    Date of last drink:   Longest period of sobriety:    Substance/Chemical Abuse/Recreational Drug History:  Substance used:   Date of last substance use: Tobacco Use: yes   History of rehab treatment:  How many times in rehab:  Last time in rehab:  Family history of substance abuse:    Opiates: It was discussed with pt they would not be receiving opiates unless they were within 3 days post surgery/acute injury. Patient voiced understanding and agreed. Psychiatric Review Of Systems:     Recent Sleep changes: yes   Recent appetite changes: yes   Recent weight changes/Pounds gained (+) or lost (-): no      Medical History:     Medical Diagnosis/Issues:   CT today in ED:no  Use of 02 or CPAP: no  Ambulatory: yes  Independent or Need assistance with Self Care:     PCP: No primary care provider on file. Current Medications:   Scheduled Meds: No current facility-administered medications for this encounter. No current outpatient medications on file.      Mental Status Evaluation:     Appearance:  casually dressed   Behavior:  Within Normal Limits   Speech:  soft   Mood:  decreased range   Affect:  flat   Thought Process:  blocked   Thought Content:     Sensorium:  person   Cognition:  impaired due to    Insight:  limited       Collateral Information:     Name: arlene Early   Relationship: friend of family   Phone Number: [phone]  Collateral:     Current living arrangement: lives with grandma   Current Support System: family   Employment: factory     Disposition:     Choose one of the options below for disposition:     1. Decision to admit to :yes    If yes, which unit Adult or Geriatric Unit:  Adult  Is patient voluntary: no  If no has a 72 hold been initiated: yes  Admission Diagnosis: depression     Does the patient have a guardian or Medical POA:   Has the guardian been notified or Medical POA:       2. Decision to Discharge:   Does not meet criteria for acceptance to   unit due to:     3. Transferred:       Patient was transferred due to:      Other follow up information provided:      Agapito Bryant RN

## 2022-01-05 NOTE — PROGRESS NOTES
[facility] Daily Shift Assessment  Nursing Progress Note    Room: [facility]/601-01 Name: Hortencia Santiago Age: 31 y.o. Gender: male   Dx: <principal problem not specified>  Precautions: suicide risk  Target Symptoms:   Accu-Chek: NoSleep: Yes,Sleep Quality Good SI No AVH auditory 5 [facility]  ADLs: No Speech: hesitant Depression: \"its in the middle\" Anxiety: 0   Participation LevelNone  Appetite: Fair  Respiratory symptoms: No Headache: No Body aches: No Fever: No Cough: No  Patients encouraged to wear masks, wash hands frequently and practice social distancing while on the unit: Yes  Visitation: No \  Participation Anai Barfield    Complaints:none    Notes: Patient is alert and oriented x 4. Pleasant and calm. Withdrawn, guarded and isolated to room. Not social and not attending groups. Laying in bed at this time with eyes open staring at ceiling, expressionless. Affect is flat, thought processes are slow and blocking, speech is slow and hesitant. When asked about any active suicidal ideations he responded \"when you say that word, it makes me think of my brother. \" Positive for auditory hallucinations, states \" I cant explain them. \" Denies command hallucinations. Evasive during interview.     Signature: Electronically signed by Boogie Henson RN on 1/5/22 at 10:22 AM CST

## 2022-01-05 NOTE — PROGRESS NOTES
Collateral obtained from: patients friend Lev Duarte [phone]     Immediate Stressors & Time Episode Began: Patient started to act strange and was pacing up and down the hallway and he went in an got knifes and was acting like someone was after him. Patient started to pack his things and said that his nephew was going to leave and he started to act like his mind had clicked and he ended up going to group home for 12 hours. Patient came home like he was in shock and would not eat and would just stare off into the [address]. Patient is employed and is going to work everyday. Patients brother passed away last year and he has been having issues since then. Patient went to [address]  5 years ago. Patient has a history of being in group home for 3 years. Patient father went to half-way when patient was 5 years. Patient has two children in [facility] and has not seen his children in 3 years. Diagnosis/Hx of compliance with meds: No    Tx Hx/Past hospitalizations:  First admissions    Family hx of psychiatric issues: No issues    Substance Abuse: History of prescriptions pills    Pending Legal: History of being incarnated. Safety Issues (Weapons? Hx of attempts): No issues    Support system/Medication Managed by:  The importance of medication management and locking extra medication in a secured location was explained and reccommended to collateral.     Additional Info: Patient lives with his grandmother

## 2022-01-05 NOTE — PLAN OF CARE
Problem: Depressive Behavior With or Without Suicide Precautions:  Goal: Able to verbalize acceptance of life and situations over which he or she has no control  Outcome: Ongoing  Goal: Able to verbalize and/or display a decrease in depressive symptoms  Outcome: Ongoing  Goal: Ability to disclose and discuss suicidal ideas will improve  Outcome: Ongoing     Problem: Anxiety:  Goal: Level of anxiety will decrease  Outcome: Ongoing     Problem: Pain:  Goal: Pain level will decrease  Outcome: Ongoing  Goal: Control of acute pain  Outcome: Ongoing  Goal: Control of chronic pain  Outcome: Ongoing     Problem: Suicide risk  Goal: Provide patient with safe environment  Outcome: Ongoing     Problem: Health Maintenance - Impaired:  Goal: Ability to perform activities of daily living will improve  Outcome: Ongoing  Goal: Maintenance of adequate nutrition will improve  Outcome: Ongoing

## 2022-01-05 NOTE — PROGRESS NOTES
Treatment Team Note:    ARIEL met with [address] [address] team to discuss Pts [address] plans. Progress/Behavior/Group Attendance: TBD    Target Symptoms/Reason for admission: Pt reports sleep deprivation, pt says he hasnt slept at all for 5 days. Pt had a brother pass away and says its really been bothering him. Pt states his brother killed himself in 2020 and suicide crosses his mind but he has things to live for. Pt then just goes blank when I am asking him things. Pt stares at nothing and wont follow commands or respond when I touch his arm. Per family Pt had an incident with police where they said he was intoxicated but he wasnt under the influence on dec 26. Pt friend says this is new behavior for past two weeks. Pt isnt able to work or drive and this is a change. Pt isnt able to follow commands. Pt is not on any medications. Pt seems to be blocking.  Pt was checked out medically at [facility] but came out  clear on January 1st. MRI,CT negative at [facility].    Diagnoses: Depression NOS    UDS: Neg     BAL: Neg    AftercarePlan: [address]    Pt lives with: grandmother    Collateral obtained from: grandmother  On:    Family Session: TBA    Misc:

## 2022-01-05 NOTE — PROGRESS NOTES
Admission Note      Reason for admission/Target Symptom: Patient admitted to [facility] due to states reason for ED visit, \" Pt reports sleep deprivation, pt says he hasnt slept at all for 5 days. Pt had a brother pass away and says its really been bothering him. Pt states his brother killed himself in 2020 and suicide crosses his mind but he has things to live for. Pt then just goes blank when I am asking him things. Pt stares at nothing and wont follow commands or respond when I touch his arm. Per family Pt had an incident with police where they said he was intoxicated but he wasnt under the influence on dec 26. Pt friend says this is new behavior for past two weeks. Pt isnt able to work or drive and this is a change. Pt isnt able to follow commands. Pt is not on any medications. Pt seems to be blocking. Pt was checked out medically at [facility] but came out  clear on January 1st. MRI,CT negative at [facility].  Diagnoses: Depression NOS  UDS: Neg  BAL:  Neg    SW met with treatment team to discuss patient's treatment including care planning, discharge planning, and follow-up needs. Pt has been admitted to [facility]. Treatment team has identified patient's discharge needs as medication management and outpatient therapy/counseling. Pt confirmed  the need for ongoing treatment post inpatient stay. Pt was also provided a handout of contact information for drug and alcohol treatment centers and other community support service such as [facility], AA, and [facility].

## 2022-01-05 NOTE — PROGRESS NOTES
Behavioral Services  Medicare Certification Upon Admission    I certify that this patient's inpatient psychiatric hospital admission is medically necessary for:    [x] (1) Treatment which could reasonably be expected to improve this patient's condition,       [] (2) Or for diagnostic study;     AND     [x](2) The inpatient psychiatric services are provided while the individual is under the care of a physician and are included in the individualized plan of care.     Estimated length of stay/service 3-5 days     Plan for post-hospital care TBA    Electronically signed by Santo Franks MD on 1/5/2022 at 9:59 AM

## 2022-01-06 LAB
CHOLESTEROL, TOTAL: 101 MG/DL (ref 160–199)
HBA1C MFR BLD: 5.4 % (ref 4–6)
HDLC SERPL-MCNC: 33 MG/DL (ref 55–121)
LDL CHOLESTEROL CALCULATED: 52 MG/DL
TRIGL SERPL-MCNC: 81 MG/DL (ref 0–149)
TSH REFLEX FT4: 0.78 UIU/ML (ref 0.35–5.5)
VITAMIN B-12: 258 PG/ML (ref 211–946)
VITAMIN D 25-HYDROXY: 20.2 NG/ML

## 2022-01-06 PROCEDURE — 84443 ASSAY THYROID STIM HORMONE: CPT

## 2022-01-06 PROCEDURE — 80061 LIPID PANEL: CPT

## 2022-01-06 PROCEDURE — 36415 COLL VENOUS BLD VENIPUNCTURE: CPT

## 2022-01-06 PROCEDURE — 82306 VITAMIN D 25 HYDROXY: CPT

## 2022-01-06 PROCEDURE — 82607 VITAMIN B-12: CPT

## 2022-01-06 PROCEDURE — 6370000000 HC RX 637 (ALT 250 FOR IP): Performed by: PSYCHIATRY & NEUROLOGY

## 2022-01-06 PROCEDURE — 99233 SBSQ HOSP IP/OBS HIGH 50: CPT | Performed by: PSYCHIATRY & NEUROLOGY

## 2022-01-06 PROCEDURE — 83036 HEMOGLOBIN GLYCOSYLATED A1C: CPT

## 2022-01-06 PROCEDURE — 1240000000 HC EMOTIONAL WELLNESS R&B

## 2022-01-06 RX ORDER — RISPERIDONE 1 MG/1
1 TABLET, FILM COATED ORAL NIGHTLY
Status: DISCONTINUED | OUTPATIENT
Start: 2022-01-06 | End: 2022-01-07 | Stop reason: HOSPADM

## 2022-01-06 RX ORDER — VITAMIN B COMPLEX
1000 TABLET ORAL DAILY
Status: DISCONTINUED | OUTPATIENT
Start: 2022-01-07 | End: 2022-01-06

## 2022-01-06 RX ORDER — CHOLECALCIFEROL (VITAMIN D3) 125 MCG
500 CAPSULE ORAL DAILY
Status: DISCONTINUED | OUTPATIENT
Start: 2022-01-07 | End: 2022-01-07 | Stop reason: HOSPADM

## 2022-01-06 RX ORDER — ERGOCALCIFEROL 1.25 MG/1
50000 CAPSULE ORAL WEEKLY
Status: DISCONTINUED | OUTPATIENT
Start: 2022-01-07 | End: 2022-01-07 | Stop reason: HOSPADM

## 2022-01-06 RX ADMIN — RISPERIDONE 1 MG: 1 TABLET ORAL at 20:59

## 2022-01-06 RX ADMIN — TRAZODONE HYDROCHLORIDE 50 MG: 50 TABLET ORAL at 20:59

## 2022-01-06 NOTE — PLAN OF CARE
Problem: Depressive Behavior With or Without Suicide Precautions:  Goal: Able to verbalize acceptance of life and situations over which he or she has no control  Description: Able to verbalize acceptance of life and situations over which he or she has no control  Outcome: Ongoing  Goal: Able to verbalize and/or display a decrease in depressive symptoms  Description: Able to verbalize and/or display a decrease in depressive symptoms  Outcome: Ongoing  Goal: Ability to disclose and discuss suicidal ideas will improve  Description: Ability to disclose and discuss suicidal ideas will improve  Outcome: Ongoing     Problem: Anxiety:  Goal: Level of anxiety will decrease  Description: Level of anxiety will decrease  Outcome: Ongoing     Problem: Pain:  Description: Pain management should include both nonpharmacologic and pharmacologic interventions.   Goal: Pain level will decrease  Description: Pain level will decrease  Outcome: Ongoing  Goal: Control of acute pain  Description: Control of acute pain  Outcome: Ongoing  Goal: Control of chronic pain  Description: Control of chronic pain  Outcome: Ongoing     Problem: Suicide risk  Description: Suicide risk  Goal: Provide patient with safe environment  Description: Provide patient with safe environment  Outcome: Ongoing     Problem: Health Maintenance - Impaired:  Goal: Ability to perform activities of daily living will improve  Description: Ability to perform activities of daily living will improve  Outcome: Ongoing  Goal: Maintenance of adequate nutrition will improve  Description: Maintenance of adequate nutrition will improve  Outcome: Ongoing

## 2022-01-06 NOTE — PROGRESS NOTES
Requirement Note     SW met with pt to complete Psychosocial and CSSR-S on this date. Patients long and short term goals discussed. Patient voiced understanding. Treatment plan sheet signed. Patient verbalized understanding of the treatment plan. Patient participated in goals and objectives of the treatment plan. Patient completed safety plan with , patient received copy of plan, and original was placed into patient's chart. In the last 6 months has the pt been a danger to self: NO  In the last 6 months has the pt been a danger to others: NO  Legal Guardian/POA: NO     Provided patient with [employer] handout entitled \"Quitting Smoking. \"  Reviewed handout with patient: addressing dangers of smoking, developing coping skills, and providing basic information about quitting. Patient received all components practical counseling of tobacco practical counseling during the hospital stay.

## 2022-01-06 NOTE — PROGRESS NOTES
Department of Psychiatry  Attending Progress Note     Chief complaint: \"I'm ok\"    SUBJECTIVE:   Chart reviewed, discussed with the team.  Patient isolates to self. Did not attend groups. Med compliant but was paranoid about his medications. Patient is observed resting in bed this morning. Presents with a flat affect. Staring somewhat inappropriately. Answers \"I do not know\" to most questions. States he is doing okay overall. \"Taking it 1 day at a time. \" Denies SI/HI/AVH. States he may have a court date coming up. OBJECTIVE    Physical  Wt Readings from Last 3 Encounters:   01/04/22 160 lb (72.6 kg)   03/29/16 160 lb (72.6 kg)   01/20/16 158 lb (71.7 kg)     Temp Readings from Last 3 Encounters:   01/05/22 97.9 °F (36.6 °C) (Temporal)   03/29/16 98.1 °F (36.7 °C) (Oral)   01/20/16 98.1 °F (36.7 °C) (Oral)     BP Readings from Last 3 Encounters:   01/05/22 105/66   03/29/16 132/87   01/20/16 120/80     Pulse Readings from Last 3 Encounters:   01/05/22 57   03/29/16 88   01/20/16 70        Review of Systems: 14-point review of systems negative except as described above    Mental Status Examination:   Appearance:  Stated age. Gait stable. No abnormal movements or tremor. Behavior: Withdrawn. Speech: Hypoverbal  Mood: \"Ok \"   Affect: Flat  Thought Process: Appears linear. Thought Content:  Denies SI/HI. Mild paranoia. Perceptions: Denies auditory or visual hallucinations at present time. Not responding to internal stimuli. Concentration: Intact. Orientation: to person, place, date, and situation. Language: Intact. Fund of information: Intact. Memory: Recent and remote appear intact. Neurovegitative: Fair appetite and sleep. Insight: Limited. Judgment: Limited.     Data  Lab Results   Component Value Date    WBC 4.2 (L) 01/04/2022    HGB 12.4 (L) 01/04/2022    HCT 38.2 (L) 01/04/2022    MCV 85.7 01/04/2022     01/04/2022      Lab Results   Component Value Date     01/04/2022 K 3.9 01/04/2022     01/04/2022    CO2 26 01/04/2022    BUN 11 01/04/2022    CREATININE 0.9 01/04/2022    GLUCOSE 62 (L) 01/04/2022    CALCIUM 9.0 01/04/2022    PROT 6.9 01/04/2022    LABALBU 4.4 01/04/2022    BILITOT <0.2 01/04/2022    ALKPHOS 58 01/04/2022    AST 74 (H) 01/04/2022    ALT 28 01/04/2022    LABGLOM >60 01/04/2022    GFRAA >59 01/04/2022       Medications    Current Facility-Administered Medications:     traZODone (DESYREL) tablet 50 mg, 50 mg, Oral, Nightly PRN, Stef Vizcarra MD, 50 mg at 01/05/22 2153    risperiDONE (RISPERDAL) tablet 0.5 mg, 0.5 mg, Oral, Nightly, Stef Vizcarra MD, 0.5 mg at 01/05/22 2153    melatonin disintegrating tablet 5 mg, 5 mg, Oral, Nightly, Toni Chase MD, 5 mg at 01/05/22 0005    acetaminophen (TYLENOL) tablet 650 mg, 650 mg, Oral, Q4H PRN, Toni Chase MD    polyethylene glycol [facility]) packet 17 g, 17 g, Oral, Daily PRN, Toni Chase MD    ASSESSMENT AND PLAN  DSM 5 DIAGNOSIS  Impression  Psychosis unspecified  R/o substance-induced psychosis  Insomnia unspecified    No significant improvement c/t yesterday. Continue to observe. Plan:   1. Psychiatric Medications:   Increase Risperdal to help with paranoia. Monitor for side effects. The risks, benefits, side effects, indications, contraindications, alternatives and adverse effects of the medications have been discussed with patient. 2. Continue to provide supportive psychotherapy. Encourage socialization and participation in recreational activities. Work on coping skills. 3. Medical Issues:    Continue medical monitoring by Dr. Zenaida Ta and associates. 4. Disposition:     to provide outpatient resources and facilitate disposition.      Amount of time spent with patient:      35 minutes with greater than 50 % of the time spent in counseling and collaboration of care

## 2022-01-06 NOTE — PROGRESS NOTES
Treatment Team Note:     ARIEL met with [address] [address] team to discuss Pts TX and DC plans.      Progress/Behavior/Group Attendance: TBD     Target Symptoms/Reason for admission: Pt reports sleep deprivation, pt says he hasnt slept at all for 5 days. Pt had a brother pass away and says its really been bothering him.  Pt states his brother killed himself in 2020 and suicide crosses his mind but he has things to live for. Pt then just goes blank when I am asking him things. Pt stares at nothing and wont follow commands or respond when I touch his arm. Per family Pt had an incident with police where they said he was intoxicated but he wasnt under the influence on dec 26. Pt friend says this is new behavior for past two weeks. Pt isnt able to work or drive and this is a change. Pt isnt able to follow commands. Pt is not on any medications. Pt seems to be blocking.  Pt was checked out medically at [facility] but came out  clear on January 1st. MRI,CT negative at [facility].     Diagnoses:  Psychosis unspecified, R/o substance-induced psychosis, Insomnia unspecified    UDS: Neg             BAL: Neg     AftercarePlan: [address]     Pt lives with: grandmother     Collateral obtained from: grandmother  On:     Family Session: TBA     Misc:

## 2022-01-06 NOTE — PROGRESS NOTES
[facility] Adult Unit Daily Assessment  Nursing Progress Note    Room: [address] [place] [address]   Name: Russell Mcege   Age: 31 y.o. Gender: male   Dx: <principal problem not specified>  Precautions: suicide risk  Inpatient Status: involuntary       SLEEP:    Sleep Quality   Sleep Medications: Yes   PRN Sleep Meds: Yes       MEDICAL:    Other PRN Meds: No   Med Compliant: Yes  Accu-Chek: No  Oxygen/CPAP/BiPAP: No  CIWA/CINA: No   PAIN Assessment: none  Side Effects from medication: No    Is Patient experiencing any respiratory symptoms (headache, fever, body aches, cough. Charmaine Kid ): no  Patient educated by nursing to practice social distancing, wear masks, wash hands frequently: yes      PSYCH:    Depression: \"Medium\"   Anxiety: \"Medium\"  SI denies suicidal ideation   HI Negative for homicidal ideation      AVH: ROBERT      GENERAL:    Appetite: improved    Social: No   Speech:  Minimal, hesitant and soft spoken  Appearance: Appropriately dressed, healthy looking. Took shower this shift    GROUP:    Group Participation: No  Participation Quality: None    Notes:       Patient has remained isolative to his room this shift, with the exception of coming out for snacks. He has been slightly more verbal than yesterday, but speech is soft and hesitant. He is eating snacks and has showered this shift. He is suspicious of medications but is complaint with staff and is agreeable with mouth check.     Electronically signed by Heriberto Santos LPN on 8/3/01 at 02:58 PM CST

## 2022-01-06 NOTE — GROUP NOTE
Group Therapy Note    Date: 1/6/2022    Group Start Time: 1530  Group End Time: 36  Group Topic: Healthy Living/Wellness    [facility] 6 ADULT [facility]    Thai Lemos RN                Patient's Goal:  Medication Education    Status After Intervention:  Unchanged    Participation Level: None    Participation Quality: Lethargic      Speech:  mute          Affective Functioning: Constricted/Restricted      Level of consciousness:  Drowsy and Inattentive      Response to Learning: Resistant      Endings: None Reported    Modes of Intervention: Education      Discipline Responsible: Registered Nurse      Signature:   Thai Lemos RN

## 2022-01-06 NOTE — PROGRESS NOTES
Group Therapy Note    Start Time: 800  End Time:  775  Number of Participants: 10    Type of Group: Community Meeting       Patient's Goal:  Cleaning up      Notes:      Participation Level:  Active Listener       Participation Quality: Appropriate      Thought Process/Content: Logical      Affective Functioning: Congruent      Mood: calm      Level of consciousness:  Alert      Modes of Intervention: Support      Discipline Responsible: Behavioral Health Tech II      Signature:  Deanna Vizcaino

## 2022-01-06 NOTE — PROGRESS NOTES
BHI Daily Shift Assessment  Nursing Progress Note    Room: [facility]-01 Name: Feliz Wakefield Age: 31 y.o. Ethnicity: -American Gender: male   Dx: <principal problem not specified>  Precautions: suicide risk  CPAP: No    Accu-Chek: No  MSE:  Status and Exam  Normal: No  Facial Expression: Sad,Flat  Affect: Constricted  Level of Consciousness: Alert  Mood:Normal: No  Mood: Depressed,Anxious,Elated  Motor Activity:Normal: No  Motor Activity: Decreased  Interview Behavior: Cooperative  Preception: [place] to [employer] to Time,[place] to [place] to Place  Attention:Normal: No  Attention: Distractible  Thought Processes: Blocking  Thought Content:Normal: No  Thought Content: Preoccupations  Hallucinations: Unable to assess  Delusions: No  Memory:Normal: No  Memory: Poor Recent  Insight and Judgment: No  Insight and Judgment: Poor Judgment,Poor Insight  Present Suicidal Ideation: No  Present Homicidal Ideation: No  Sleep: Yes, Fair, has restless sleep Hours Slept: 7 Sched Sleep Meds: Yes PRN Sleep Meds: No Other PRN Meds: No Med Compliant: Yes Appetite: good Percent Meals: 75% Social: No ADLs: No Speech: hesitant Depression: henry Anxiety: henry    Pt slept well as reported by staff. He did come out of room to eat breakfast but then took food into his room to eat, he was redirected to eat in dining area with peers and again attempted to eat in his room for lunch. He isolates in his room unless it is meal time or he is coming to get a snack. When lab came to collect lab draw he would not speak and kept his arms under covers so they were unable to preform draw. He is not social with peers or staff. He does not attend groups. He was medication compliant for night shift. He only shrugs his shoulders when asked questions or states \"I don't know. \"      Jayde Mckenzie RN

## 2022-01-07 VITALS
HEIGHT: 69 IN | HEART RATE: 61 BPM | SYSTOLIC BLOOD PRESSURE: 90 MMHG | BODY MASS INDEX: 23.7 KG/M2 | TEMPERATURE: 97 F | RESPIRATION RATE: 16 BRPM | DIASTOLIC BLOOD PRESSURE: 71 MMHG | OXYGEN SATURATION: 99 % | WEIGHT: 160 LBS

## 2022-01-07 PROBLEM — R45.851 DEPRESSION WITH SUICIDAL IDEATION: Status: RESOLVED | Noted: 2022-01-04 | Resolved: 2022-01-07

## 2022-01-07 PROBLEM — F32.A DEPRESSION WITH SUICIDAL IDEATION: Status: RESOLVED | Noted: 2022-01-04 | Resolved: 2022-01-07

## 2022-01-07 PROBLEM — F34.9 PERSISTENT MOOD (AFFECTIVE) DISORDER, UNSPECIFIED (HCC): Status: ACTIVE | Noted: 2022-01-04

## 2022-01-07 PROBLEM — F29 PSYCHOSIS (HCC): Status: RESOLVED | Noted: 2022-01-05 | Resolved: 2022-01-07

## 2022-01-07 PROBLEM — F19.959 SUBSTANCE-INDUCED PSYCHOTIC DISORDER (HCC): Status: ACTIVE | Noted: 2022-01-05

## 2022-01-07 PROCEDURE — 99239 HOSP IP/OBS DSCHRG MGMT >30: CPT | Performed by: PSYCHIATRY & NEUROLOGY

## 2022-01-07 PROCEDURE — 6370000000 HC RX 637 (ALT 250 FOR IP): Performed by: FAMILY MEDICINE

## 2022-01-07 PROCEDURE — 5130000000 HC BRIDGE APPOINTMENT

## 2022-01-07 RX ORDER — ERGOCALCIFEROL 1.25 MG/1
50000 CAPSULE ORAL WEEKLY
Qty: 11 CAPSULE | Refills: 1 | Status: SHIPPED | OUTPATIENT
Start: 2022-01-14 | End: 2022-03-26

## 2022-01-07 RX ORDER — RISPERIDONE 1 MG/1
1 TABLET, FILM COATED ORAL NIGHTLY
Qty: 30 TABLET | Refills: 1 | Status: SHIPPED | OUTPATIENT
Start: 2022-01-07 | End: 2022-02-06

## 2022-01-07 RX ORDER — TRAZODONE HYDROCHLORIDE 50 MG/1
50 TABLET ORAL NIGHTLY PRN
Qty: 30 TABLET | Refills: 1 | Status: SHIPPED | OUTPATIENT
Start: 2022-01-07 | End: 2022-02-06

## 2022-01-07 RX ADMIN — CYANOCOBALAMIN TAB 500 MCG 500 MCG: 500 TAB at 09:05

## 2022-01-07 RX ADMIN — ERGOCALCIFEROL 50000 UNITS: 1.25 CAPSULE ORAL at 09:05

## 2022-01-07 NOTE — PROGRESS NOTES
BHI Daily Shift Assessment- Adult Unit  Nursing Progress Note          Room: [address] [place] [address]   Name: Feliz Wakefield   Age: 31 y.o. Gender: male   Dx: <principal problem not specified>  Precautions: suicide risk and homocidal risk  Inpatient Status: involuntary     SLEEP:    Sleep: Yes,   Sleep Quality: pt reports he does not know   Hours Slept: ROBERT   Sleep Medications: Yes [id number]  PRN Sleep Meds: Yes trazodone 50 mg      MEDICAL:      Other PRN Meds: No   Med Compliant: Yes   Accu-Chek: No   Oxygen/CPAP/BiPAP: No  CIWA/CINA: No   PAIN Assessment: none  Side Effects from medication: No    Is Patient experiencing any respiratory symptoms (headache, fever, body aches, cough. Donzell Pyo ): no  Patient educated by nursing to practice social distancing, wear masks, wash hands frequently: yes      Metabolic Screening:    Lab Results   Component Value Date    LABA1C 5.4 01/06/2022       Lab Results   Component Value Date    CHOL 101 (L) 01/06/2022     Lab Results   Component Value Date    TRIG 81 01/06/2022     Lab Results   Component Value Date    HDL 33 (L) 01/06/2022     No components found for: LDLCAL  No results found for: LABVLDL      Body mass index is 23.63 kg/m². BP Readings from Last 2 Encounters:   01/06/22 (!) 105/59   03/29/16 132/87         PSYCH:     SI denies suicidal ideation    HI Negative for homicidal ideation        AVH:Absent      Depression: ROBERT  Anxiety:ROBERT}       GENERAL:      Appetite: decreased  Social: No Speech: normal   Appearance:appropriately dressed, appropriately groomed, good hygiene and healthy looking  Assistive Devices: none  Level of Assist: Independent      GROUP:    Group Participation: Yes  Participation LevelMinimal    Participation Ayush Claire    Notes: Pt is in the TV area during this interview. Pt is pleasant and is cooperative ,he does not seem to know the answers to some questions. Pt does not think he has been sleeping as well as he usually does but can not answer how many hours he usually sleeps. He states his appetite is not great but he does not eat that much anyway. Pt does say he is depressed or sad at times and thinks suicide but has never had a plan or intent. Pt does endorse anxiety and some guilt but says he helps to take care of his aging grandmother and that creates some stress for him. Pt is medication compliant and did come out of his room this evening to watch a basketball game on TV. Will continue to monitor for safety.

## 2022-01-07 NOTE — BH NOTE
Received notification from [address] regarding PA Case [id number]  , medication has been approved, notified provider, nurse in charge of care and pharmacy of the determination. Electronically signed, Alfonzo Murray.  Jani LOPEZ, Utilization Review , [facility], on 1/7/2022 at 1050AM

## 2022-01-07 NOTE — PROGRESS NOTES
mother contacted  it for information on patients condition. Patient did call  Mother and gave her his code and signed a medical release for his mother.   She was updated on his condition

## 2022-01-07 NOTE — PROGRESS NOTES
Treatment Team Note:     ARIEL met with [address] [address] team to discuss Pts TX and DC plans.      Progress/Behavior/Group Attendance: TBD     Target Symptoms/Reason for admission: Pt reports sleep deprivation, pt says he hasnt slept at all for 5 days. Pt had a brother pass away and says its really been bothering him.  Pt states his brother killed himself in 2020 and suicide crosses his mind but he has things to live for. Pt then just goes blank when I am asking him things. Pt stares at nothing and wont follow commands or respond when I touch his arm. Per family Pt had an incident with police where they said he was intoxicated but he wasnt under the influence on dec 26. Pt friend says this is new behavior for past two weeks. Pt isnt able to work or drive and this is a change. Pt isnt able to follow commands. Pt is not on any medications. Pt seems to be blocking.  Pt was checked out medically at [facility] but came out  clear on January 1st. MRI,CT negative at [facility].     Diagnoses:  Psychosis unspecified, R/o substance-induced psychosis, Insomnia unspecified     UDS: Neg             [facility]     Pt lives with: grandmother     Collateral obtained from: grandmother  On:     Family Session: TBA     Misc:

## 2022-01-07 NOTE — PROGRESS NOTES
Group Therapy Note    Start Time: 900  End Time:  178  Number of Participants: 9    Type of Group: Community Meeting       Patient's Goal:  Get out more      Notes:      Participation Level:  Active Listener       Participation Quality: Appropriate      Thought Process/Content: Logical      Affective Functioning: Congruent      Mood: calm      Level of consciousness:  Alert      Modes of Intervention: Support      Discipline Responsible: Behavioral Health Tech II      Signature:  Radha Castro

## 2022-01-07 NOTE — PROGRESS NOTES
Group Note    Number of Participants in Group: 8  Number of Patients on Unit:8      Patient attended group:Yes  Reason for Absence:  Intervention for patient absence:        Type of Group:   Wrap-Up/Relaxation    Patient's Goal: See wrap up group sheet    Participation Level:    active           Patient Response to Learning: Yes    Patient's Behavior: Cooperative    Is Patient Social/Interacting: Yes    Relaxation:   Television:Yes   Reading:Yes   Game/Puzzle:No   Phone: Yes       Notes/Comments:      Please see patient's wrap up group sheet for patient's comments       Electronically signed by Bettye Vega on 1/6/22 at 11:54 PM CST

## 2022-01-07 NOTE — PROGRESS NOTES
[facility]  Discharge Note  Bridge Appointment completed: Reviewed Discharge Instructions with patient. Patient verbalizes understanding and agreement with the discharge plan using the teachback method. Referral for Outpatient Tobacco Cessation Counseling, upon discharge (tammy X if applicable and completed):    ( )  Hospital staff assisted patient to call Quit Line or faxed referral                                   during hospitalization                  ( )  Recognizing danger situations (included triggers and roadblocks), if not completed on admission                    ( )  Coping skills (new ways to manage stress, exercise, relaxation techniques, changing routine, distraction), if not completed on admission                                                           ( )  Basic information about quitting (benefits of quitting, techniques in how to quit, available resources, if not completed on admission  ( ) Referral for counseling faxed to [facility]   ( ) Patient refused referral  ( ) Patient refused counseling  (x ) Patient refused smoking cessation medication upon discharge    Vaccinations (tammy X if applicable and completed):  ( ) Patient states already received influenza vaccine elsewhere  ( ) Patient received influenza vaccine during this hospitalization  ( x) Patient refused influenza vaccine at this time      Pt discharged with followings belongings:       Valuables retrieved from [facility] and returned to patient. Patient left department with staff   via ambulatory to private car  , discharged to home  . Patient education on aftercare instructions: yes  Patient verbalize understanding of AVS:  yes. Suicidal Ideations? No AVH? denies HI?  Negative for homicidal ideation       Status EXAM upon discharge:  Status and Exam  Normal: No  Facial Expression: Sad,Flat  Affect: Congruent,Blunt  Level of Consciousness: Alert  Mood:Normal: No  Mood: Anxious,Depressed,Empty  Motor Activity:Normal: No  Motor Activity: Decreased  Interview Behavior: Evasive  Preception: [place] to Person,[place] to Time,[place] to [place] to Place  Attention:Normal: No  Attention: Unable to Concentrate,Distractible  Thought Processes: Blocking  Thought Content:Normal: No  Thought Content: Preoccupations  Hallucinations: None  Delusions: No  Memory:Normal: No  Memory: Poor Recent,Poor Remote  Insight and Judgment: No  Insight and Judgment: Poor Judgment,Poor Insight  Present Suicidal Ideation: No  Present Homicidal Ideation: No

## 2022-01-07 NOTE — PROGRESS NOTES
Progress Note  Rachell Trinidad  1/6/2022 10:22 PM  Subjective:   Admit Date:   1/4/2022      CC/ADMIT DX:       Interval History:   Reviewed overnight events and nursing notes. He has no new medical issues. I have reviewed all labs/diagnostics from the last 24hrs. ROS:   I have done a 10 point ROS and all are negative, except what is mentioned in the HPI. ADULT DIET; Regular    Medications:      risperiDONE  1 mg Oral Nightly    [START ON 1/7/2022] Vitamin D  1,000 Units Oral Daily    [START ON 1/7/2022] vitamin B-12  500 mcg Oral Daily           Objective:   Vitals: BP (!) 105/59   Pulse 55   Temp 97.7 °F (36.5 °C)   Resp 16   Ht 5' 9\" (1.753 m)   Wt 160 lb (72.6 kg)   SpO2 99%   BMI 23.63 kg/m²  No intake or output data in the 24 hours ending 01/06/22 2222  General appearance: alert and cooperative with exam  Extremities: extremities normal, atraumatic, no cyanosis or edema  Neurologic:  No obvious focal neurologic deficits. Skin: no rashes    Assessment and Plan: Active Problems:    Depression with suicidal ideation    Psychosis ([address]  Resolved Problems:    * No resolved hospital problems. *    Vit D Def    Plan:  1. Continue present medication(s)   2. Replace Vit D  3. Follow with Psych      Discharge planning:   home     Reviewed treatment plans with the patient and/or family.              Electronically signed by Ant Schwartz MD on 1/6/2022 at 10:22 PM

## 2022-01-07 NOTE — PROGRESS NOTES
Discharge Note     Pt discharging on this date. Pt denies SI, HI, and AVH at this time. Pt reports improvement in behavior and is leaving unit in overall good condition. SW and pt discussed pt's follow up appointments and importance of attending appointments as scheduled, pt voiced understanding and agreement. Pt and SW also discussed pt safety plan and pt able to verbally identify: warning signs, coping strategies, places and people that help make the pt feel better/distract negative thoughts, friends/family/agencies/professionals the pt can reach out to in a crisis, and something that is important to the pt/worth living for. Pt provided the national suicide prevention hotline number (3-690-376-003-352-6993) as well as local community behavioral health [facility]) crisis number for emergencies (2-190-415-109-050-6854). [facility]  [address] [facility] on _01_/10__/22 @ 11:00am. Patient will [facility], patient will be seen on 01__/21__/22 @ 4:30pm for the med management appt.      Referral to out patient tobacco cessation counseling treatment:    Patient refused referral to outpatient tobacco cessation counseling    SW offered to assist pt with transportation, pt reports that he has transportation home

## 2022-01-07 NOTE — DISCHARGE SUMMARY
Discharge Summary     Patient ID:  Brittany Johnson  [id number]  24 y.o.  1995    Admit date: 1/4/2022  Discharge date: 1/7/2022    Admitting Physician: Kareem Wadsworth MD   Attending Physician: Gladys Real MD  Discharge Provider: Gladys Real MD     Admission Diagnoses:   Psychosis unspecified  R/o substance-induced psychosis  Insomnia unspecified    Discharge Diagnoses:   Mood disorder unspecified  Vitamin D deficiency  Vitamin B12 deficiency    Admission Condition: poor    Discharged Condition: stable    Indication for Admission: psychosis    CHIEF COMPLAINT:  \"I'm fine\"     History obtained from: patient, chart     HISTORY OF PRESENT ILLNESS:    32 yo AA male admitted due to disorganized behavior. Family friend stated he has not been the same, he cannot work and sometimes shakes all over.  Sometimes he will not respond.  His brother committed suicide in 80. UDS negative.     Patient is observed resting in bed this morning. He is withdrawn and presents with psychomotor retardation. Provides short answers to questions. His responses are delayed. States he slept \"a little bit. \"  He denies suicidal ideation. States he had suicidal thoughts previously, unable to recall the last time he had them. He is a poor historian and does not offer much information. States he has been stressed out due to some family issues. Claims he is working at [employer]. He lives with his grandmother. He denies depression and anxiety. When asked about hallucinations, states \"it is difficult to describe\". Becomes tearful when asked about one of his tattoos. Turned away and stopped talking.     PSYCHIATRIC HISTORY:    Diagnoses: Denies  Suicide attempts/gestures: Denies   Prior hospitalizations: Denies   Medication trials: Unable to recall  Mental health contact: Lost to follow-up   Head trauma: concussions     SUBSTANCE USE HISTORY:  Denies.     Hospital Course:  Patient was admitted to the behavioral health floor and was acclimated to the unit. He was placed on suicide precautions. Labs were reviewed and physical exam was completed by Dr. Sandra Mayberry and associates. Home medications were reconciled. AZIZA was obtained and reviewed. Patient was started on Risperdal for psychosis. Trazodone was given for sleep. Patient was med-compliant. Patient isolated to his room initially and was guarded. With treatment, psychotic features resolved. Patient became more socially appropriate and future-oriented. Sleep and appetite improved. There was no evidence of suicidality. With the above-mentioned medications changes as well as psychotherapeutic interventions, patient reported considerable improvement in his condition and requested discharge. It was felt that patient was at his baseline. It was believed that his initial symptoms were caused by substance use. The importance of sobriety was discussed. Patient has no access to guns at home. There were no safety concerns per collateral.     On 1/7/2022 it was therefore decided to discharge the patient, as it was felt that he received maximal benefit from his hospitalization. This patient is not suicidal, homicidal or psychotic at discharge. He does not present danger to self or others.       Number of antipsychotic medication prescribed at discharge: 1  IF MORE THAN ONE IS USED: NA    History of greater than 3 failed multiple monotherapy trials: NA  Monotherapy taper plan/ cross taper in progress: NA  Augmentation of Clozapine: NA    Referral to addiction treatment: patient refused    Prescription for Alcohol or Drug Disorder Medication: patient refused    Prescription for Tobacco Cessation medication: none    If no prescriptions for Tobacco Cessation must document why: patient refused    Consults: Internal medicine    Significant Diagnostic Studies:   Lab Results   Component Value Date    WBC 4.2 (L) 01/04/2022    HGB 12.4 (L) 01/04/2022    HCT 38.2 (L) 01/04/2022    MCV 85.7 01/04/2022  01/04/2022     Lab Results   Component Value Date     01/04/2022    K 3.9 01/04/2022     01/04/2022    CO2 26 01/04/2022    BUN 11 01/04/2022    CREATININE 0.9 01/04/2022    GLUCOSE 62 (L) 01/04/2022    CALCIUM 9.0 01/04/2022    PROT 6.9 01/04/2022    LABALBU 4.4 01/04/2022    BILITOT <0.2 01/04/2022    ALKPHOS 58 01/04/2022    AST 74 (H) 01/04/2022    ALT 28 01/04/2022    LABGLOM >60 01/04/2022    GFRAA >59 01/04/2022         Lab Results   Component Value Date    SOFZWMZQ93 258 01/06/2022     Lab Results   Component Value Date    VITD25 20.2 (L) 01/06/2022     Lab Results   Component Value Date    CHOL 101 (L) 01/06/2022     Lab Results   Component Value Date    TRIG 81 01/06/2022     Lab Results   Component Value Date    HDL 33 (L) 01/06/2022     Lab Results   Component Value Date    LDLCALC 52 01/06/2022     No results found for: LABVLDL, VLDL  No results found for: CHOLHDLRATIO  Lab Results   Component Value Date    LABA1C 5.4 01/06/2022     No results found for: EAG  Lab Results   Component Value Date    TSHFT4 0.78 01/06/2022       Treatments: RN and SW    Mental status examination at the time of discharge:  Alert, Oriented X 4  Appearance:  Improved Hygiene, smiled  Speech with Regular Rate and Rhythm  Eye Contact:  Good  No Psychomotor Agitation/Retardation Noted  Attitude:  Cooperative  Mood:  \"Better. I want to go home and back to work. \"  Affective: Congruent, appropriate to the situation, with a normal range and intensity  Thought Processes:  Coherently communicated, logical and goal oriented  Thought Content:  No Suicidal Ideation, No Homicidal Ideation, No Auditory or Visual Hallucinations, NO Overt Delusions  Insight: Improved  Judgement: Improved  Memory is intact for both remote and recent  Intellectual Functioning:  Within the [address] of Knowledge:  Adequate  Attention and Concentration:  Adequate    Discharge Exam:  Please, see medical note    Disposition: home    Patient Instructions:   Current Discharge Medication List      START taking these medications    Details   risperiDONE (RISPERDAL) 1 MG tablet Take 1 tablet by mouth nightly  Qty: 30 tablet, Refills: 1      vitamin B-12 500 MCG tablet Take 1 tablet by mouth daily  Qty: 30 tablet, Refills: 1      vitamin D (ERGOCALCIFEROL) 1.25 MG (98812 UT) CAPS capsule Take 1 capsule by mouth once a week for 11 doses  Qty: 11 capsule, Refills: 1      traZODone (DESYREL) 50 MG tablet Take 1 tablet by mouth nightly as needed for Sleep  Qty: 30 tablet, Refills: 1             Activity: as tolerated  Diet: regular diet  Wound Care: none needed    Follow-up:  Follow up with PCP  on follow up with PCP, please review labs/imaging done during this Hospital follow up, and discuss any additional/repeat testing or treatment needed with your PCP     Jan10 Go to [place] [address]  Monday Lan 10, 2022  Intake/therapy appt with Sheldon Jama at 11:00am, please arrive at 10:00am, please provide a current list of medications.   [facility]   [facility], [address]   Phone [phone]   Fax [phone]   CRISIS LINE: [phone]     Jan21 Go to [place] [address]  Friday Jan 21, 2022  Medication managment appt with [facility] at 4:30pm, please provide a current list of medications [facility]   [facility], [address]   Phone [phone]   Fax [phone]   CRISIS LINE: [phone]        Time worked: 34 min    Participation: good    Electronically signed by Chemo Nowak MD on 1/7/2022 at 10:08 AM

## 2022-01-07 NOTE — PLAN OF CARE
Group Therapy Note     Date: 1/7/2022  Start Time: 1100  End Time:  3552  Number of Participants: 6     Type of Group: Psychoeducation     Wellness Binder Information  Module Name:  Staying well  Session Number:  1     Patient's Goal:  daily maintenance and coping skills     Notes:  pt was verbally prompted to attend group. Pt refused. Information about staying well was provided. Status After Intervention:       Participation Level:      Participation Quality:         Speech:           Thought Process/Content:         Affective Functioning:         Mood:         Level of consciousness:          Response to Learning:         Endings:      Modes of Intervention:         Discipline Responsible: Psychoeducational Specialist        Signature:  Robson Gumzan

## 2022-01-07 NOTE — BH NOTE
Received notification for Prior Authorization (PA) request for the following medication:    Risperidone    Submitted clinical information at this time to [employer], via covermymeds web portal, HIPPA compliant/Confidential web portal.  Response time varies, 1 to 5 business days for a response. PA Case ID#  [id number]     Electronically signed, Sharyle Slate.  Jani LOPEZ, Utilization Review , [facility],  1/7/2022 at 1047AM

## 2022-01-07 NOTE — PROGRESS NOTES
CLINICAL PHARMACY NOTE: MEDS TO BEDS    Total # of Prescriptions Filled: 4   The following medications were delivered to the patient:  · Vitamin b-12 500mcg  · Vitamin D2 1.25mg  · Trazodone 50mg  · Risperidone 1mg    Additional Documentation:    Delivered to RACQUEL Dickens.

## 2022-01-08 NOTE — PROGRESS NOTES
Progress Note  Myra Qureshi  1/7/2022 8:38 PM  Subjective:   Admit Date:   1/4/2022      CC/ADMIT DX:       Interval History:   Reviewed overnight events and nursing notes. He denies any physical complaints. I have reviewed all labs/diagnostics from the last 24hrs. ROS:   I have done a 10 point ROS and all are negative, except what is mentioned in the HPI. No diet orders on file    Medications:             Objective:   Vitals: BP 90/71   Pulse 61   Temp 97 °F (36.1 °C) (Temporal)   Resp 16   Ht 5' 9\" (1.753 m)   Wt 160 lb (72.6 kg)   SpO2 99%   BMI 23.63 kg/m²  No intake or output data in the 24 hours ending 01/07/22 2038  General appearance: alert and cooperative with exam  Extremities: extremities normal, atraumatic, no cyanosis or edema  Neurologic:  No obvious focal neurologic deficits. Skin: no rashes    Assessment and Plan:   Principal Problem:    Persistent mood (affective) disorder, unspecified (HCC)  Active Problems:    Substance-induced psychotic disorder ([facility] [address]  Resolved Problems:    * No resolved hospital problems. *    Vit D Def    Plan:  1. Continue present medication(s)   2. He is medically stable. I will monitor for any changes or concerns. 3.  Follow with Psych      Discharge planning:   home     Reviewed treatment plans with the patient and/or family.              Electronically signed by Tanya Roper MD on 1/7/2022 at 8:38 PM

## 2022-01-08 NOTE — PROGRESS NOTES
SW attempted to contact pt to follow-up with him after he discharged from the unit yesterday to see if he had any questions or concerns that needed to be addressed. SW called the contact number listed for the pt and it went to voicemail. SW left a message, including the unit's contact number, letting him know we were calling to check on him and to please call us back if he has any questions or concerns.

## 2022-06-21 ENCOUNTER — HOSPITAL ENCOUNTER (EMERGENCY)
Facility: HOSPITAL | Age: 27
Discharge: HOME | End: 2022-06-21
Attending: FAMILY MEDICINE | Admitting: FAMILY MEDICINE

## 2022-06-21 ENCOUNTER — APPOINTMENT (OUTPATIENT)
Dept: CT IMAGING | Facility: HOSPITAL | Age: 27
End: 2022-06-21

## 2022-06-21 VITALS
TEMPERATURE: 98.3 F | RESPIRATION RATE: 20 BRPM | DIASTOLIC BLOOD PRESSURE: 72 MMHG | SYSTOLIC BLOOD PRESSURE: 109 MMHG | OXYGEN SATURATION: 99 % | HEART RATE: 67 BPM

## 2022-06-21 DIAGNOSIS — G47.00 INSOMNIA, UNSPECIFIED TYPE: Primary | ICD-10-CM

## 2022-06-21 LAB
ALBUMIN SERPL-MCNC: 5.1 G/DL (ref 3.5–5.2)
ALBUMIN/GLOB SERPL: 1.5 G/DL
ALP SERPL-CCNC: 86 U/L (ref 39–117)
ALT SERPL W P-5'-P-CCNC: 13 U/L (ref 1–41)
AMMONIA BLD-SCNC: 24 UMOL/L (ref 16–60)
AMPHET+METHAMPHET UR QL: NEGATIVE
AMPHETAMINES UR QL: NEGATIVE
ANION GAP SERPL CALCULATED.3IONS-SCNC: 14 MMOL/L (ref 5–15)
APAP SERPL-MCNC: <5 MCG/ML (ref 0–30)
AST SERPL-CCNC: 21 U/L (ref 1–40)
BACTERIA UR QL AUTO: ABNORMAL /HPF
BARBITURATES UR QL SCN: NEGATIVE
BASOPHILS # BLD AUTO: 0.03 10*3/MM3 (ref 0–0.2)
BASOPHILS NFR BLD AUTO: 0.6 % (ref 0–1.5)
BENZODIAZ UR QL SCN: NEGATIVE
BILIRUB SERPL-MCNC: 1.2 MG/DL (ref 0–1.2)
BILIRUB UR QL STRIP: NEGATIVE
BUN SERPL-MCNC: 11 MG/DL (ref 6–20)
BUN/CREAT SERPL: 13.8 (ref 7–25)
BUPRENORPHINE SERPL-MCNC: NEGATIVE NG/ML
CALCIUM SPEC-SCNC: 9.9 MG/DL (ref 8.6–10.5)
CANNABINOIDS SERPL QL: NEGATIVE
CHLORIDE SERPL-SCNC: 104 MMOL/L (ref 98–107)
CLARITY UR: CLEAR
CO2 SERPL-SCNC: 23 MMOL/L (ref 22–29)
COCAINE UR QL: NEGATIVE
COLOR UR: YELLOW
CREAT SERPL-MCNC: 0.8 MG/DL (ref 0.76–1.27)
DEPRECATED RDW RBC AUTO: 41 FL (ref 37–54)
EGFRCR SERPLBLD CKD-EPI 2021: 123.6 ML/MIN/1.73
EOSINOPHIL # BLD AUTO: 0.07 10*3/MM3 (ref 0–0.4)
EOSINOPHIL NFR BLD AUTO: 1.3 % (ref 0.3–6.2)
ERYTHROCYTE [DISTWIDTH] IN BLOOD BY AUTOMATED COUNT: 13.2 % (ref 12.3–15.4)
ETHANOL UR QL: <0.01 %
GLOBULIN UR ELPH-MCNC: 3.3 GM/DL
GLUCOSE SERPL-MCNC: 112 MG/DL (ref 65–99)
GLUCOSE UR STRIP-MCNC: NEGATIVE MG/DL
HCT VFR BLD AUTO: 41.6 % (ref 37.5–51)
HGB BLD-MCNC: 14.1 G/DL (ref 13–17.7)
HGB UR QL STRIP.AUTO: NEGATIVE
HOLD SPECIMEN: NORMAL
HYALINE CASTS UR QL AUTO: ABNORMAL /LPF
IMM GRANULOCYTES # BLD AUTO: 0.01 10*3/MM3 (ref 0–0.05)
IMM GRANULOCYTES NFR BLD AUTO: 0.2 % (ref 0–0.5)
KETONES UR QL STRIP: ABNORMAL
LEUKOCYTE ESTERASE UR QL STRIP.AUTO: ABNORMAL
LIPASE SERPL-CCNC: 33 U/L (ref 13–60)
LYMPHOCYTES # BLD AUTO: 2.1 10*3/MM3 (ref 0.7–3.1)
LYMPHOCYTES NFR BLD AUTO: 39.9 % (ref 19.6–45.3)
MCH RBC QN AUTO: 28.5 PG (ref 26.6–33)
MCHC RBC AUTO-ENTMCNC: 33.9 G/DL (ref 31.5–35.7)
MCV RBC AUTO: 84.2 FL (ref 79–97)
METHADONE UR QL SCN: NEGATIVE
MONOCYTES # BLD AUTO: 0.59 10*3/MM3 (ref 0.1–0.9)
MONOCYTES NFR BLD AUTO: 11.2 % (ref 5–12)
NEUTROPHILS NFR BLD AUTO: 2.46 10*3/MM3 (ref 1.7–7)
NEUTROPHILS NFR BLD AUTO: 46.8 % (ref 42.7–76)
NITRITE UR QL STRIP: NEGATIVE
NRBC BLD AUTO-RTO: 0 /100 WBC (ref 0–0.2)
OPIATES UR QL: NEGATIVE
OXYCODONE UR QL SCN: NEGATIVE
PCP UR QL SCN: NEGATIVE
PH UR STRIP.AUTO: 7.5 [PH] (ref 5–8)
PLATELET # BLD AUTO: 229 10*3/MM3 (ref 140–450)
PMV BLD AUTO: 9.7 FL (ref 6–12)
POTASSIUM SERPL-SCNC: 4.1 MMOL/L (ref 3.5–5.2)
PROPOXYPH UR QL: NEGATIVE
PROT SERPL-MCNC: 8.4 G/DL (ref 6–8.5)
PROT UR QL STRIP: ABNORMAL
RBC # BLD AUTO: 4.94 10*6/MM3 (ref 4.14–5.8)
RBC # UR STRIP: ABNORMAL /HPF
REF LAB TEST METHOD: ABNORMAL
SALICYLATES SERPL-MCNC: <0.3 MG/DL
SODIUM SERPL-SCNC: 141 MMOL/L (ref 136–145)
SP GR UR STRIP: 1.02 (ref 1–1.03)
SQUAMOUS #/AREA URNS HPF: ABNORMAL /HPF
TRICYCLICS UR QL SCN: NEGATIVE
UROBILINOGEN UR QL STRIP: ABNORMAL
WBC # UR STRIP: ABNORMAL /HPF
WBC NRBC COR # BLD: 5.26 10*3/MM3 (ref 3.4–10.8)
WHOLE BLOOD HOLD COAG: NORMAL
WHOLE BLOOD HOLD SPECIMEN: NORMAL

## 2022-06-21 PROCEDURE — 83690 ASSAY OF LIPASE: CPT | Performed by: FAMILY MEDICINE

## 2022-06-21 PROCEDURE — 80143 DRUG ASSAY ACETAMINOPHEN: CPT | Performed by: FAMILY MEDICINE

## 2022-06-21 PROCEDURE — 82077 ASSAY SPEC XCP UR&BREATH IA: CPT | Performed by: FAMILY MEDICINE

## 2022-06-21 PROCEDURE — 80179 DRUG ASSAY SALICYLATE: CPT | Performed by: FAMILY MEDICINE

## 2022-06-21 PROCEDURE — 80306 DRUG TEST PRSMV INSTRMNT: CPT | Performed by: FAMILY MEDICINE

## 2022-06-21 PROCEDURE — 96360 HYDRATION IV INFUSION INIT: CPT

## 2022-06-21 PROCEDURE — 70450 CT HEAD/BRAIN W/O DYE: CPT

## 2022-06-21 PROCEDURE — 99283 EMERGENCY DEPT VISIT LOW MDM: CPT

## 2022-06-21 PROCEDURE — 85025 COMPLETE CBC W/AUTO DIFF WBC: CPT | Performed by: FAMILY MEDICINE

## 2022-06-21 PROCEDURE — 81001 URINALYSIS AUTO W/SCOPE: CPT | Performed by: FAMILY MEDICINE

## 2022-06-21 PROCEDURE — 82140 ASSAY OF AMMONIA: CPT | Performed by: FAMILY MEDICINE

## 2022-06-21 PROCEDURE — 80053 COMPREHEN METABOLIC PANEL: CPT | Performed by: FAMILY MEDICINE

## 2022-06-21 RX ORDER — SODIUM CHLORIDE 9 MG/ML
125 INJECTION, SOLUTION INTRAVENOUS CONTINUOUS
Status: DISCONTINUED | OUTPATIENT
Start: 2022-06-21 | End: 2022-06-22 | Stop reason: HOSPADM

## 2022-06-21 RX ORDER — SODIUM CHLORIDE 0.9 % (FLUSH) 0.9 %
10 SYRINGE (ML) INJECTION AS NEEDED
Status: DISCONTINUED | OUTPATIENT
Start: 2022-06-21 | End: 2022-06-22 | Stop reason: HOSPADM

## 2022-06-21 RX ADMIN — SODIUM CHLORIDE 125 ML/HR: 9 INJECTION, SOLUTION INTRAVENOUS at 21:26

## 2022-06-21 RX ADMIN — SODIUM CHLORIDE 1000 ML: 9 INJECTION, SOLUTION INTRAVENOUS at 19:30

## 2022-06-22 NOTE — ED PROVIDER NOTES
Subjective   This patient is a 27-year-old young man with a previous history of some kind of psychiatric illness for which he was going to therapy and taking psychiatric medications.  Initially when he first presented to the ED he was extremely somnolent and unable to answer questions.  After couple of hours in the ED where he slept, received IV fluids became around and was able to discuss his case with me and his grandmother who was at the bedside.  She states that he has been off his medications and not going to therapy for the last couple of months.  Apparently previously been doing really well with therapy and his meds.  She states that he has been having a lot of pacing at night, decreased sleep and smoking cigarettes a lot.  The patient states that he is just dealing with his own issues in his life the way he likes to deal with them.  He does not want to talk about hallucinations and neither does he want to talk about suicidality.          Review of Systems   All other systems reviewed and are negative.      No past medical history on file.    Not on File    No past surgical history on file.    No family history on file.    Social History     Socioeconomic History   • Marital status: Single           Objective   Physical Exam  Vitals and nursing note reviewed.   Constitutional:       Appearance: He is well-developed.      Comments: Initially the patient was very somnolent but then became alert, conversational and oriented.   HENT:      Head: Normocephalic and atraumatic.      Right Ear: External ear normal.      Left Ear: External ear normal.      Nose: Nose normal.   Eyes:      Extraocular Movements: Extraocular movements intact.      Conjunctiva/sclera: Conjunctivae normal.   Cardiovascular:      Rate and Rhythm: Normal rate and regular rhythm.      Pulses: Normal pulses.      Heart sounds: Normal heart sounds.   Pulmonary:      Effort: Pulmonary effort is normal.      Breath sounds: Normal breath sounds.    Abdominal:      General: Bowel sounds are normal.      Palpations: Abdomen is soft.   Musculoskeletal:         General: Normal range of motion.      Cervical back: Normal range of motion and neck supple.   Skin:     General: Skin is warm and dry.      Capillary Refill: Capillary refill takes less than 2 seconds.   Neurological:      General: No focal deficit present.      Mental Status: He is alert and oriented to person, place, and time.   Psychiatric:         Behavior: Behavior normal.         Thought Content: Thought content normal.         Judgment: Judgment normal.         Procedures           ED Course                                           MDM  Number of Diagnoses or Management Options     Amount and/or Complexity of Data Reviewed  Clinical lab tests: ordered and reviewed  Tests in the radiology section of CPT®: ordered and reviewed    Patient Progress  Patient progress: stable      Final diagnoses:   Insomnia, unspecified type       ED Disposition  ED Disposition     ED Disposition   Discharge    Condition   Stable    Comment   --             No follow-up provider specified.       Medication List      No changes were made to your prescriptions during this visit.       The patient was able to converse really quite coherently by the time of discharge.  His grandmother and I encouraged him to follow-up with his therapist and consider retaking his medications.  They both feel safe taking him home and he states that he will follow her advice and get good follow-up.  They know to return to the ED if he has any recurrence of his symptoms.     Chuck Gaines MD  06/21/22 3983

## 2023-07-11 ENCOUNTER — HOSPITAL ENCOUNTER (EMERGENCY)
Age: 28
Discharge: LEFT BEFORE DISCHARGE | End: 2023-07-11
Attending: EMERGENCY MEDICINE
Payer: MEDICAID

## 2023-07-11 ENCOUNTER — APPOINTMENT (OUTPATIENT)
Dept: CT IMAGING | Age: 28
End: 2023-07-11
Payer: MEDICAID

## 2023-07-11 ENCOUNTER — APPOINTMENT (OUTPATIENT)
Dept: ULTRASOUND IMAGING | Age: 28
End: 2023-07-11
Payer: MEDICAID

## 2023-07-11 ENCOUNTER — TELEPHONE (OUTPATIENT)
Dept: UROLOGY | Age: 28
End: 2023-07-11

## 2023-07-11 VITALS
HEIGHT: 68 IN | RESPIRATION RATE: 20 BRPM | SYSTOLIC BLOOD PRESSURE: 127 MMHG | HEART RATE: 78 BPM | DIASTOLIC BLOOD PRESSURE: 89 MMHG | WEIGHT: 140 LBS | BODY MASS INDEX: 21.22 KG/M2 | OXYGEN SATURATION: 98 % | TEMPERATURE: 98.3 F

## 2023-07-11 DIAGNOSIS — N43.3 LEFT HYDROCELE: ICD-10-CM

## 2023-07-11 DIAGNOSIS — N50.89 MASS OF LEFT TESTICLE: Primary | ICD-10-CM

## 2023-07-11 LAB
ALBUMIN SERPL-MCNC: 4.5 G/DL (ref 3.5–5.2)
ALP SERPL-CCNC: 402 U/L (ref 40–130)
ALT SERPL-CCNC: 12 U/L (ref 5–41)
ANION GAP SERPL CALCULATED.3IONS-SCNC: 10 MMOL/L (ref 7–19)
AST SERPL-CCNC: 28 U/L (ref 5–40)
BASOPHILS # BLD: 0 K/UL (ref 0–0.2)
BASOPHILS NFR BLD: 0.4 % (ref 0–1)
BILIRUB SERPL-MCNC: 0.4 MG/DL (ref 0.2–1.2)
BILIRUB UR QL STRIP: NEGATIVE
BUN SERPL-MCNC: 6 MG/DL (ref 6–20)
C TRACH DNA UR QL NAA+PROBE: NOT DETECTED
CALCIUM SERPL-MCNC: 9.6 MG/DL (ref 8.6–10)
CHLORIDE SERPL-SCNC: 105 MMOL/L (ref 98–111)
CLARITY UR: ABNORMAL
CO2 SERPL-SCNC: 26 MMOL/L (ref 22–29)
COLOR UR: YELLOW
CREAT SERPL-MCNC: 0.9 MG/DL (ref 0.5–1.2)
EOSINOPHIL # BLD: 0.1 K/UL (ref 0–0.6)
EOSINOPHIL NFR BLD: 1 % (ref 0–5)
ERYTHROCYTE [DISTWIDTH] IN BLOOD BY AUTOMATED COUNT: 14 % (ref 11.5–14.5)
GLUCOSE SERPL-MCNC: 67 MG/DL (ref 74–109)
GLUCOSE UR STRIP.AUTO-MCNC: NEGATIVE MG/DL
HCT VFR BLD AUTO: 37.8 % (ref 42–52)
HGB BLD-MCNC: 12.9 G/DL (ref 14–18)
HGB UR STRIP.AUTO-MCNC: NEGATIVE MG/L
IMM GRANULOCYTES # BLD: 0 K/UL
KETONES UR STRIP.AUTO-MCNC: NEGATIVE MG/DL
LEUKOCYTE ESTERASE UR QL STRIP.AUTO: NEGATIVE
LYMPHOCYTES # BLD: 2.5 K/UL (ref 1.1–4.5)
LYMPHOCYTES NFR BLD: 30.8 % (ref 20–40)
MCH RBC QN AUTO: 28.6 PG (ref 27–31)
MCHC RBC AUTO-ENTMCNC: 34.1 G/DL (ref 33–37)
MCV RBC AUTO: 83.8 FL (ref 80–94)
MONOCYTES # BLD: 0.9 K/UL (ref 0–0.9)
MONOCYTES NFR BLD: 10.8 % (ref 0–10)
N GONORRHOEA DNA UR QL NAA+PROBE: NOT DETECTED
NEUTROPHILS # BLD: 4.7 K/UL (ref 1.5–7.5)
NEUTS SEG NFR BLD: 56.9 % (ref 50–65)
NITRITE UR QL STRIP.AUTO: NEGATIVE
PH UR STRIP.AUTO: 7.5 [PH] (ref 5–8)
PLATELET # BLD AUTO: 220 K/UL (ref 130–400)
PMV BLD AUTO: 9.4 FL (ref 9.4–12.4)
POTASSIUM SERPL-SCNC: 4 MMOL/L (ref 3.5–5)
PROT SERPL-MCNC: 7.2 G/DL (ref 6.6–8.7)
PROT UR STRIP.AUTO-MCNC: NEGATIVE MG/DL
RBC # BLD AUTO: 4.51 M/UL (ref 4.7–6.1)
SODIUM SERPL-SCNC: 141 MMOL/L (ref 136–145)
SP GR UR STRIP.AUTO: 1.01 (ref 1–1.03)
T VAGINALIS DNA UR QL NAA+PROBE: NOT DETECTED
UROBILINOGEN UR STRIP.AUTO-MCNC: 1 E.U./DL
WBC # BLD AUTO: 8.2 K/UL (ref 4.8–10.8)

## 2023-07-11 PROCEDURE — 99285 EMERGENCY DEPT VISIT HI MDM: CPT

## 2023-07-11 PROCEDURE — 74177 CT ABD & PELVIS W/CONTRAST: CPT

## 2023-07-11 PROCEDURE — 87661 TRICHOMONAS VAGINALIS AMPLIF: CPT

## 2023-07-11 PROCEDURE — 87591 N.GONORRHOEAE DNA AMP PROB: CPT

## 2023-07-11 PROCEDURE — 6360000004 HC RX CONTRAST MEDICATION: Performed by: EMERGENCY MEDICINE

## 2023-07-11 PROCEDURE — 81003 URINALYSIS AUTO W/O SCOPE: CPT

## 2023-07-11 PROCEDURE — 80053 COMPREHEN METABOLIC PANEL: CPT

## 2023-07-11 PROCEDURE — 85025 COMPLETE CBC W/AUTO DIFF WBC: CPT

## 2023-07-11 PROCEDURE — 76870 US EXAM SCROTUM: CPT

## 2023-07-11 PROCEDURE — 87491 CHLMYD TRACH DNA AMP PROBE: CPT

## 2023-07-11 PROCEDURE — 36415 COLL VENOUS BLD VENIPUNCTURE: CPT

## 2023-07-11 RX ORDER — SODIUM CHLORIDE, SODIUM LACTATE, POTASSIUM CHLORIDE, AND CALCIUM CHLORIDE .6; .31; .03; .02 G/100ML; G/100ML; G/100ML; G/100ML
1000 INJECTION, SOLUTION INTRAVENOUS ONCE
Status: DISCONTINUED | OUTPATIENT
Start: 2023-07-11 | End: 2023-07-11 | Stop reason: HOSPADM

## 2023-07-11 RX ORDER — MORPHINE SULFATE 4 MG/ML
4 INJECTION, SOLUTION INTRAMUSCULAR; INTRAVENOUS ONCE
Status: DISCONTINUED | OUTPATIENT
Start: 2023-07-11 | End: 2023-07-11 | Stop reason: HOSPADM

## 2023-07-11 RX ORDER — ONDANSETRON 2 MG/ML
4 INJECTION INTRAMUSCULAR; INTRAVENOUS ONCE
Status: DISCONTINUED | OUTPATIENT
Start: 2023-07-11 | End: 2023-07-11 | Stop reason: HOSPADM

## 2023-07-11 RX ORDER — NAPROXEN 500 MG/1
500 TABLET ORAL 2 TIMES DAILY PRN
Qty: 30 TABLET | Refills: 0 | Status: SHIPPED | OUTPATIENT
Start: 2023-07-11

## 2023-07-11 RX ADMIN — IOPAMIDOL 90 ML: 755 INJECTION, SOLUTION INTRAVENOUS at 03:06

## 2023-07-11 ASSESSMENT — PAIN SCALES - GENERAL: PAINLEVEL_OUTOF10: 10

## 2023-07-11 ASSESSMENT — PAIN DESCRIPTION - DESCRIPTORS: DESCRIPTORS: ACHING

## 2023-07-11 ASSESSMENT — ENCOUNTER SYMPTOMS
VOMITING: 0
SORE THROAT: 0
NAUSEA: 0
ABDOMINAL PAIN: 1
DIARRHEA: 0
RHINORRHEA: 0
COUGH: 0
SHORTNESS OF BREATH: 0
BACK PAIN: 0

## 2023-07-11 ASSESSMENT — PAIN - FUNCTIONAL ASSESSMENT: PAIN_FUNCTIONAL_ASSESSMENT: 0-10

## 2023-07-11 ASSESSMENT — PAIN DESCRIPTION - LOCATION: LOCATION: SCROTUM

## 2023-07-11 NOTE — ED PROVIDER NOTES
[facility] EMERGENCY DEPT  eMERGENCY dEPARTMENT eNCOUnter      Pt Name: Jannie Koyanagi  MRN: [id number]  [address] [facility] 1995  Date of evaluation: 7/11/2023  Provider: Epifanio Doe MD    [address]       Chief Complaint   Patient presents with    Groin Swelling     Patient states he has had left testicle swelling for a month. Tonight the left swelling is worse after getting out of the shower           HISTORY OF PRESENT ILLNESS   (Location/Symptom, Timing/Onset,Context/Setting, Quality, Duration, Modifying Factors, Severity)  Note limiting factors. Jannie Koyanagi is a 33 y.o. male who presents to the emergency department for worsening left testicular swelling and pain for the past 1 month. He has not seen anyone for this because he does not like going to the doctor. He denies any testicular trauma. No fevers or chills or UTI symptoms. States when his testicle hurts it does radiate somewhat up into his abdomen. Denies any penile discharge. No recent sexual activity within the past couple of months but does have a history of STDs in the past.  No prior abdominal surgeries. HPI    NursingNotes were reviewed. REVIEW OF SYSTEMS    (2-9 systems for level 4, 10 or more for level 5)     Review of Systems   Constitutional:  Negative for chills and fever. HENT:  Negative for rhinorrhea and sore throat. Respiratory:  Negative for cough and shortness of breath. Cardiovascular:  Negative for chest pain and leg swelling. Gastrointestinal:  Positive for abdominal pain. Negative for diarrhea, nausea and vomiting. Genitourinary:  Positive for scrotal swelling and testicular pain. Negative for dysuria, flank pain, frequency, hematuria, penile pain and penile swelling. Musculoskeletal:  Negative for back pain and neck pain. Neurological:  Negative for dizziness and headaches. All other systems reviewed and are negative.          PAST MEDICALHISTORY     Past Medical History:   Diagnosis Date    Kidney stone

## 2023-07-11 NOTE — TELEPHONE ENCOUNTER
Office left vm with only number in patients chart (grandmother). Patient is needing to see Dr. Reed Au for testicular mass. Patient left ED AMA but is still needing to f/u with office. He can contact office to schedule f/u appt.

## 2023-07-11 NOTE — TELEPHONE ENCOUNTER
Patient returning a called back to  Jeffery. Please called the patient @ [phone]      Thank you

## 2023-07-12 ENCOUNTER — TELEPHONE (OUTPATIENT)
Dept: UROLOGY | Age: 28
End: 2023-07-12

## 2023-07-12 NOTE — TELEPHONE ENCOUNTER
Office contacted patient to offer him a new patient appt this week, patients grandmother will have patient call office back to confirm scheduling appt.

## 2023-07-12 NOTE — TELEPHONE ENCOUNTER
Office reached out to patient after not hearing back from him this morning. There was a vm left for him to contact office as we are needing to see him sometime this week.

## 2023-07-14 ENCOUNTER — TELEPHONE (OUTPATIENT)
Dept: UROLOGY | Age: 28
End: 2023-07-14

## 2023-07-14 NOTE — TELEPHONE ENCOUNTER
[address] tried to contact patient to get him scheduled for an appointment with office. No answer, left VM for patient to contact office at their earliest convenience.

## 2023-07-18 ENCOUNTER — TELEPHONE (OUTPATIENT)
Dept: UROLOGY | Age: 28
End: 2023-07-18

## 2023-07-18 NOTE — TELEPHONE ENCOUNTER
Patient has been scheduled for New Patient appointment and office has contacted him with this. Patient voiced understanding.

## 2023-07-18 NOTE — TELEPHONE ENCOUNTER
Patient calling the office back to scheduled appointment with Dr Mavis Gilbert . Please called the patient.       Thank you

## 2023-07-19 ENCOUNTER — OFFICE VISIT (OUTPATIENT)
Dept: UROLOGY | Age: 28
End: 2023-07-19
Payer: MEDICAID

## 2023-07-19 VITALS — HEIGHT: 68 IN | TEMPERATURE: 97.7 F | BODY MASS INDEX: 23.04 KG/M2 | WEIGHT: 152 LBS

## 2023-07-19 DIAGNOSIS — N50.89 TESTICULAR MASS: ICD-10-CM

## 2023-07-19 DIAGNOSIS — N50.89 TESTICULAR MASS: Primary | ICD-10-CM

## 2023-07-19 LAB
AFP SERPL-MCNC: 3.1 NG/ML (ref 0–8.3)
BILIRUBIN, POC: NORMAL
BLOOD URINE, POC: NORMAL
CLARITY, POC: CLEAR
COLOR, POC: YELLOW
GLUCOSE URINE, POC: NORMAL
KETONES, POC: NORMAL
LDH SERPL-CCNC: 370 U/L (ref 91–215)
LEUKOCYTE EST, POC: NORMAL
NITRITE, POC: NORMAL
PH, POC: 8.5
PROTEIN, POC: NORMAL
SPECIFIC GRAVITY, POC: 1.02
UROBILINOGEN, POC: 1

## 2023-07-19 PROCEDURE — 81003 URINALYSIS AUTO W/O SCOPE: CPT | Performed by: UROLOGY

## 2023-07-19 PROCEDURE — 99204 OFFICE O/P NEW MOD 45 MIN: CPT | Performed by: UROLOGY

## 2023-07-19 ASSESSMENT — ENCOUNTER SYMPTOMS
VOMITING: 0
CHEST TIGHTNESS: 0
TROUBLE SWALLOWING: 0
FACIAL SWELLING: 0
EYE DISCHARGE: 0
COLOR CHANGE: 0
NAUSEA: 0
EYE REDNESS: 0
SHORTNESS OF BREATH: 0

## 2023-07-19 NOTE — H&P (VIEW-ONLY)
Morgan Mehta is a 33 y.o. male who presents today   Chief Complaint   Patient presents with    New Patient     I was referred here today by the ED for a testicular mass. Left testicular mass:  Patient 79-year-old otherwise healthy male who was seen in emergency department on 7/11/2023. He was complaining of swelling enlargement of his left testicle. He says this has been gradually increasing in size over the last 4 to 6 months apparently became very large over the  last month. He denies abdominal pain back pain. No family history of testis cancer. He denies history of dysuria, STDs, or trauma. STD panel was negative. He had a CT scan with IV contrast shows no significant pathologic retroperitoneal adenopathy. He also had a testicular ultrasound which shows a heterogeneous mass solid-appearing the left scrotum measuring 9.7 x 6 x 6.7 cm concerning for testicular neoplasm he has not had any tumor markers. He denies a family history of testis cancer or cryptorchidism    Past Medical History:   Diagnosis Date    Kidney stone        Past Surgical History:   Procedure Laterality Date    DENTAL SURGERY         Current Outpatient Medications   Medication Sig Dispense Refill    naproxen (NAPROSYN) 500 MG tablet Take 1 tablet by mouth 2 times daily as needed for Pain 30 tablet 0     No current facility-administered medications for this visit. No Known Allergies    Social History     Socioeconomic History    Marital status: Single     Spouse name: None    Number of children: None    Years of education: None    Highest education level: None   Tobacco Use    Smoking status: Every Day     Packs/day: 0.50     Types: Cigarettes    Smokeless tobacco: Never   Substance and Sexual Activity    Alcohol use: Yes     Comment: occ    Drug use: Not Currently     Types: Marijuana [place])       No family history on file. REVIEW OF SYSTEMS:  Review of Systems   Constitutional:  Negative for chills and fever.    HENT: WBCUA 4 01/20/2016 08:36 PM    RBCUA >900 01/20/2016 08:36 PM    BACTERIA NEGATIVE 01/20/2016 08:36 PM    CLARITYU clear 07/19/2023 02:32 PM    CLARITYU TURBID 07/11/2023 02:18 AM    SPECGRAV 1.020 07/19/2023 02:32 PM    SPECGRAV 1.014 07/11/2023 02:18 AM    LEUKOCYTESUR - 07/19/2023 02:32 PM    LEUKOCYTESUR Negative 07/11/2023 02:18 AM    UROBILINOGEN 1.0 07/11/2023 02:18 AM    BILIRUBINUR - 07/19/2023 02:32 PM    BLOODU - 07/19/2023 02:32 PM    BLOODU Negative 07/11/2023 02:18 AM    GLUCOSEU - 07/19/2023 02:32 PM    GLUCOSEU Negative 07/11/2023 02:18 AM    AMORPHOUS 1+ 03/29/2015 02:35 PM     Results for orders placed or performed in visit on 07/19/23   POCT Urinalysis No Micro (Auto)   Result Value Ref Range    Color, UA yellow     Clarity, UA clear     Glucose, UA POC -     Bilirubin, UA -     Ketones, UA -     Spec Grav, UA 1.020     Blood, UA POC -     pH, UA 8.5     Protein, UA POC -     Urobilinogen, UA 1.0     Leukocytes, UA -     Nitrite, UA -          IMAGING:  I reviewed the scrotal testicular ultrasound done on 7/11/2023 as well as a CT scan of the abdomen pelvis with IV contrast done on 7/11/2023. Basically the CT and ultrasound shows a complex heterogeneous mass of the left testicle. By ultrasound it measured 9.7 x 6 x 6.7 cm. This was concerning for malignancy. Right testis appear to be normal.  On abdominal CT scan there is no pathologically enlarged retroperitoneal adenopathy or evidence of abdominal metastasis. 1. Testicular mass, left  Concerning for testis malignancy. I discussed this with the patient. He will need to get his blood drawn today for tumor markers since these were not done in the ER. In addition we will get him scheduled as soon as possible for a left radical orchiectomy. Risk and complication procedure were discussed with the patient including the risk of tumor spillage, need for subsequent or adjuvant treatments. Hematoma,  nerve injury,  wound infection.   We also

## 2023-07-19 NOTE — PROGRESS NOTES
Follow-up for mass basically replacing the left testicle. There spermatic cord is palpated about this mass at the level of the pubic tubercle and is palpated normal.  Right testis is palpably normal  Musculoskeletal:         General: No tenderness or deformity. Normal range of motion. Cervical back: Normal range of motion and neck supple. Lymphadenopathy:      Cervical: No cervical adenopathy. Skin:     General: Skin is warm and dry. Findings: No erythema. Neurological:      General: No focal deficit present. Mental Status: He is alert and oriented to person, place, and time.    Psychiatric:         Behavior: Behavior normal.         Judgment: Judgment normal.           DATA:  CBC:   Lab Results   Component Value Date/Time    WBC 8.2 07/11/2023 02:31 AM    RBC 4.51 07/11/2023 02:31 AM    HGB 12.9 07/11/2023 02:31 AM    HCT 37.8 07/11/2023 02:31 AM    MCV 83.8 07/11/2023 02:31 AM    MCH 28.6 07/11/2023 02:31 AM    MCHC 34.1 07/11/2023 02:31 AM    RDW 14.0 07/11/2023 02:31 AM     07/11/2023 02:31 AM    MPV 9.4 07/11/2023 02:31 AM     CMP:    Lab Results   Component Value Date/Time     07/11/2023 02:31 AM    K 4.0 07/11/2023 02:31 AM     07/11/2023 02:31 AM    CO2 26 07/11/2023 02:31 AM    BUN 6 07/11/2023 02:31 AM    CREATININE 0.9 07/11/2023 02:31 AM    GFRAA >59 01/04/2022 08:45 PM    LABGLOM >60 07/11/2023 02:31 AM    GLUCOSE 67 07/11/2023 02:31 AM    PROT 7.2 07/11/2023 02:31 AM    LABALBU 4.5 07/11/2023 02:31 AM    CALCIUM 9.6 07/11/2023 02:31 AM    BILITOT 0.4 07/11/2023 02:31 AM    ALKPHOS 402 07/11/2023 02:31 AM    AST 28 07/11/2023 02:31 AM    ALT 12 07/11/2023 02:31 AM     U/A:    Lab Results   Component Value Date/Time    NITRITE - 07/19/2023 02:32 PM    COLORU yellow 07/19/2023 02:32 PM    COLORU YELLOW 07/11/2023 02:18 AM    PROTEINU - 07/19/2023 02:32 PM    PROTEINU Negative 07/11/2023 02:18 AM    PHUR 8.5 07/19/2023 02:32 PM    PHUR 7.5 07/11/2023 02:18 AM

## 2023-07-20 ENCOUNTER — ANESTHESIA (OUTPATIENT)
Dept: OPERATING ROOM | Age: 28
End: 2023-07-20
Payer: MEDICAID

## 2023-07-20 ENCOUNTER — HOSPITAL ENCOUNTER (OUTPATIENT)
Age: 28
Setting detail: OUTPATIENT SURGERY
Discharge: HOME | End: 2023-07-20
Attending: UROLOGY | Admitting: UROLOGY
Payer: MEDICAID

## 2023-07-20 ENCOUNTER — ANESTHESIA EVENT (OUTPATIENT)
Dept: OPERATING ROOM | Age: 28
End: 2023-07-20
Payer: MEDICAID

## 2023-07-20 VITALS
TEMPERATURE: 97.7 F | HEIGHT: 68 IN | BODY MASS INDEX: 22.73 KG/M2 | RESPIRATION RATE: 16 BRPM | WEIGHT: 150 LBS | HEART RATE: 62 BPM | DIASTOLIC BLOOD PRESSURE: 80 MMHG | SYSTOLIC BLOOD PRESSURE: 118 MMHG | OXYGEN SATURATION: 97 %

## 2023-07-20 DIAGNOSIS — N50.89 MASS OF LEFT TESTICLE: ICD-10-CM

## 2023-07-20 PROCEDURE — 2580000003 HC RX 258: Performed by: UROLOGY

## 2023-07-20 PROCEDURE — 3600000005 HC SURGERY LEVEL 5 BASE: Performed by: UROLOGY

## 2023-07-20 PROCEDURE — 2500000003 HC RX 250 WO HCPCS: Performed by: NURSE ANESTHETIST, CERTIFIED REGISTERED

## 2023-07-20 PROCEDURE — 54530 REMOVAL OF TESTIS: CPT | Performed by: UROLOGY

## 2023-07-20 PROCEDURE — C9399 UNCLASSIFIED DRUGS OR BIOLOG: HCPCS | Performed by: NURSE ANESTHETIST, CERTIFIED REGISTERED

## 2023-07-20 PROCEDURE — 88307 TISSUE EXAM BY PATHOLOGIST: CPT

## 2023-07-20 PROCEDURE — 7100000010 HC PHASE II RECOVERY - FIRST 15 MIN: Performed by: UROLOGY

## 2023-07-20 PROCEDURE — 3700000001 HC ADD 15 MINUTES (ANESTHESIA): Performed by: UROLOGY

## 2023-07-20 PROCEDURE — 2500000003 HC RX 250 WO HCPCS: Performed by: UROLOGY

## 2023-07-20 PROCEDURE — 3700000000 HC ANESTHESIA ATTENDED CARE: Performed by: UROLOGY

## 2023-07-20 PROCEDURE — 6360000002 HC RX W HCPCS: Performed by: UROLOGY

## 2023-07-20 PROCEDURE — 7100000011 HC PHASE II RECOVERY - ADDTL 15 MIN: Performed by: UROLOGY

## 2023-07-20 PROCEDURE — 2709999900 HC NON-CHARGEABLE SUPPLY: Performed by: UROLOGY

## 2023-07-20 PROCEDURE — 88342 IMHCHEM/IMCYTCHM 1ST ANTB: CPT

## 2023-07-20 PROCEDURE — 2580000003 HC RX 258: Performed by: NURSE ANESTHETIST, CERTIFIED REGISTERED

## 2023-07-20 PROCEDURE — 7100000001 HC PACU RECOVERY - ADDTL 15 MIN: Performed by: UROLOGY

## 2023-07-20 PROCEDURE — 6370000000 HC RX 637 (ALT 250 FOR IP): Performed by: UROLOGY

## 2023-07-20 PROCEDURE — 6360000002 HC RX W HCPCS: Performed by: ANESTHESIOLOGY

## 2023-07-20 PROCEDURE — 2500000003 HC RX 250 WO HCPCS: Performed by: ANESTHESIOLOGY

## 2023-07-20 PROCEDURE — 3600000015 HC SURGERY LEVEL 5 ADDTL 15MIN: Performed by: UROLOGY

## 2023-07-20 PROCEDURE — 88341 IMHCHEM/IMCYTCHM EA ADD ANTB: CPT

## 2023-07-20 PROCEDURE — 7100000000 HC PACU RECOVERY - FIRST 15 MIN: Performed by: UROLOGY

## 2023-07-20 PROCEDURE — 6360000002 HC RX W HCPCS: Performed by: NURSE ANESTHETIST, CERTIFIED REGISTERED

## 2023-07-20 RX ORDER — PROCHLORPERAZINE EDISYLATE 5 MG/ML
5 INJECTION INTRAMUSCULAR; INTRAVENOUS
Status: DISCONTINUED | OUTPATIENT
Start: 2023-07-20 | End: 2023-07-20 | Stop reason: HOSPADM

## 2023-07-20 RX ORDER — LIDOCAINE HYDROCHLORIDE 10 MG/ML
1 INJECTION, SOLUTION EPIDURAL; INFILTRATION; INTRACAUDAL; PERINEURAL
Status: COMPLETED | OUTPATIENT
Start: 2023-07-20 | End: 2023-07-20

## 2023-07-20 RX ORDER — ONDANSETRON 2 MG/ML
INJECTION INTRAMUSCULAR; INTRAVENOUS PRN
Status: DISCONTINUED | OUTPATIENT
Start: 2023-07-20 | End: 2023-07-20 | Stop reason: SDUPTHER

## 2023-07-20 RX ORDER — FAMOTIDINE 20 MG/1
20 TABLET, FILM COATED ORAL ONCE
Status: DISCONTINUED | OUTPATIENT
Start: 2023-07-20 | End: 2023-07-20 | Stop reason: HOSPADM

## 2023-07-20 RX ORDER — SODIUM CHLORIDE 0.9 % (FLUSH) 0.9 %
5-40 SYRINGE (ML) INJECTION EVERY 12 HOURS SCHEDULED
Status: DISCONTINUED | OUTPATIENT
Start: 2023-07-20 | End: 2023-07-20 | Stop reason: HOSPADM

## 2023-07-20 RX ORDER — SODIUM CHLORIDE 0.9 % (FLUSH) 0.9 %
5-40 SYRINGE (ML) INJECTION PRN
Status: DISCONTINUED | OUTPATIENT
Start: 2023-07-20 | End: 2023-07-20 | Stop reason: HOSPADM

## 2023-07-20 RX ORDER — HYDROMORPHONE HYDROCHLORIDE 1 MG/ML
0.5 INJECTION, SOLUTION INTRAMUSCULAR; INTRAVENOUS; SUBCUTANEOUS EVERY 5 MIN PRN
Status: DISCONTINUED | OUTPATIENT
Start: 2023-07-20 | End: 2023-07-20 | Stop reason: HOSPADM

## 2023-07-20 RX ORDER — SODIUM CHLORIDE 9 MG/ML
INJECTION, SOLUTION INTRAVENOUS CONTINUOUS
Status: DISCONTINUED | OUTPATIENT
Start: 2023-07-20 | End: 2023-07-20 | Stop reason: HOSPADM

## 2023-07-20 RX ORDER — LIDOCAINE HYDROCHLORIDE 10 MG/ML
INJECTION, SOLUTION INFILTRATION; PERINEURAL PRN
Status: DISCONTINUED | OUTPATIENT
Start: 2023-07-20 | End: 2023-07-20 | Stop reason: ALTCHOICE

## 2023-07-20 RX ORDER — HYDROCODONE BITARTRATE AND ACETAMINOPHEN 5; 325 MG/1; MG/1
1 TABLET ORAL EVERY 6 HOURS PRN
Qty: 12 TABLET | Refills: 0 | Status: SHIPPED | OUTPATIENT
Start: 2023-07-20 | End: 2023-07-23

## 2023-07-20 RX ORDER — SODIUM CHLORIDE 9 MG/ML
INJECTION, SOLUTION INTRAVENOUS PRN
Status: DISCONTINUED | OUTPATIENT
Start: 2023-07-20 | End: 2023-07-20 | Stop reason: HOSPADM

## 2023-07-20 RX ORDER — HYDROMORPHONE HYDROCHLORIDE 1 MG/ML
0.5 INJECTION, SOLUTION INTRAMUSCULAR; INTRAVENOUS; SUBCUTANEOUS
Status: DISCONTINUED | OUTPATIENT
Start: 2023-07-20 | End: 2023-07-20 | Stop reason: HOSPADM

## 2023-07-20 RX ORDER — PROPOFOL 10 MG/ML
INJECTION, EMULSION INTRAVENOUS CONTINUOUS PRN
Status: DISCONTINUED | OUTPATIENT
Start: 2023-07-20 | End: 2023-07-20 | Stop reason: SDUPTHER

## 2023-07-20 RX ORDER — DEXAMETHASONE SODIUM PHOSPHATE 4 MG/ML
INJECTION, SOLUTION INTRA-ARTICULAR; INTRALESIONAL; INTRAMUSCULAR; INTRAVENOUS; SOFT TISSUE PRN
Status: DISCONTINUED | OUTPATIENT
Start: 2023-07-20 | End: 2023-07-20 | Stop reason: SDUPTHER

## 2023-07-20 RX ORDER — FENTANYL CITRATE 50 UG/ML
25 INJECTION, SOLUTION INTRAMUSCULAR; INTRAVENOUS EVERY 5 MIN PRN
Status: DISCONTINUED | OUTPATIENT
Start: 2023-07-20 | End: 2023-07-20 | Stop reason: HOSPADM

## 2023-07-20 RX ORDER — MIDAZOLAM HYDROCHLORIDE 2 MG/2ML
2 INJECTION, SOLUTION INTRAMUSCULAR; INTRAVENOUS
Status: COMPLETED | OUTPATIENT
Start: 2023-07-20 | End: 2023-07-20

## 2023-07-20 RX ORDER — SCOLOPAMINE TRANSDERMAL SYSTEM 1 MG/1
1 PATCH, EXTENDED RELEASE TRANSDERMAL
Status: DISCONTINUED | OUTPATIENT
Start: 2023-07-20 | End: 2023-07-20 | Stop reason: HOSPADM

## 2023-07-20 RX ORDER — PROPOFOL 10 MG/ML
INJECTION, EMULSION INTRAVENOUS PRN
Status: DISCONTINUED | OUTPATIENT
Start: 2023-07-20 | End: 2023-07-20 | Stop reason: SDUPTHER

## 2023-07-20 RX ORDER — DROPERIDOL 2.5 MG/ML
0.62 INJECTION, SOLUTION INTRAMUSCULAR; INTRAVENOUS
Status: DISCONTINUED | OUTPATIENT
Start: 2023-07-20 | End: 2023-07-20 | Stop reason: HOSPADM

## 2023-07-20 RX ORDER — OXYCODONE HYDROCHLORIDE AND ACETAMINOPHEN 5; 325 MG/1; MG/1
2 TABLET ORAL EVERY 4 HOURS PRN
Status: DISCONTINUED | OUTPATIENT
Start: 2023-07-20 | End: 2023-07-20 | Stop reason: HOSPADM

## 2023-07-20 RX ORDER — BACITRACIN ZINC 500 [USP'U]/G
OINTMENT TOPICAL PRN
Status: DISCONTINUED | OUTPATIENT
Start: 2023-07-20 | End: 2023-07-20 | Stop reason: ALTCHOICE

## 2023-07-20 RX ORDER — KETOROLAC TROMETHAMINE 30 MG/ML
15 INJECTION, SOLUTION INTRAMUSCULAR; INTRAVENOUS ONCE
Status: COMPLETED | OUTPATIENT
Start: 2023-07-20 | End: 2023-07-20

## 2023-07-20 RX ORDER — ONDANSETRON 2 MG/ML
4 INJECTION INTRAMUSCULAR; INTRAVENOUS EVERY 4 HOURS PRN
Status: DISCONTINUED | OUTPATIENT
Start: 2023-07-20 | End: 2023-07-20 | Stop reason: HOSPADM

## 2023-07-20 RX ORDER — BUPIVACAINE HYDROCHLORIDE 5 MG/ML
INJECTION, SOLUTION EPIDURAL; INTRACAUDAL PRN
Status: DISCONTINUED | OUTPATIENT
Start: 2023-07-20 | End: 2023-07-20 | Stop reason: ALTCHOICE

## 2023-07-20 RX ORDER — SODIUM CHLORIDE, SODIUM LACTATE, POTASSIUM CHLORIDE, CALCIUM CHLORIDE 600; 310; 30; 20 MG/100ML; MG/100ML; MG/100ML; MG/100ML
INJECTION, SOLUTION INTRAVENOUS CONTINUOUS PRN
Status: DISCONTINUED | OUTPATIENT
Start: 2023-07-20 | End: 2023-07-20 | Stop reason: SDUPTHER

## 2023-07-20 RX ORDER — ROCURONIUM BROMIDE 10 MG/ML
INJECTION, SOLUTION INTRAVENOUS PRN
Status: DISCONTINUED | OUTPATIENT
Start: 2023-07-20 | End: 2023-07-20 | Stop reason: SDUPTHER

## 2023-07-20 RX ORDER — FENTANYL CITRATE 50 UG/ML
INJECTION, SOLUTION INTRAMUSCULAR; INTRAVENOUS PRN
Status: DISCONTINUED | OUTPATIENT
Start: 2023-07-20 | End: 2023-07-20 | Stop reason: SDUPTHER

## 2023-07-20 RX ADMIN — PROPOFOL 140 MCG/KG/MIN: 10 INJECTION, EMULSION INTRAVENOUS at 14:02

## 2023-07-20 RX ADMIN — PROPOFOL 200 MG: 10 INJECTION, EMULSION INTRAVENOUS at 13:58

## 2023-07-20 RX ADMIN — CEFAZOLIN SODIUM 1000 MG: 1 INJECTION, POWDER, FOR SOLUTION INTRAMUSCULAR; INTRAVENOUS at 14:06

## 2023-07-20 RX ADMIN — ONDANSETRON 4 MG: 2 INJECTION INTRAMUSCULAR; INTRAVENOUS at 14:46

## 2023-07-20 RX ADMIN — ROCURONIUM BROMIDE 50 MG: 10 INJECTION, SOLUTION INTRAVENOUS at 13:58

## 2023-07-20 RX ADMIN — DEXAMETHASONE SODIUM PHOSPHATE 5 MG: 4 INJECTION, SOLUTION INTRAMUSCULAR; INTRAVENOUS at 14:46

## 2023-07-20 RX ADMIN — KETOROLAC TROMETHAMINE 15 MG: 30 INJECTION, SOLUTION INTRAMUSCULAR; INTRAVENOUS at 16:12

## 2023-07-20 RX ADMIN — SUGAMMADEX 200 MG: 100 INJECTION, SOLUTION INTRAVENOUS at 15:32

## 2023-07-20 RX ADMIN — FENTANYL CITRATE 100 MCG: 0.05 INJECTION, SOLUTION INTRAMUSCULAR; INTRAVENOUS at 13:56

## 2023-07-20 RX ADMIN — ROCURONIUM BROMIDE 20 MG: 10 INJECTION, SOLUTION INTRAVENOUS at 14:31

## 2023-07-20 RX ADMIN — SODIUM CHLORIDE, SODIUM LACTATE, POTASSIUM CHLORIDE, AND CALCIUM CHLORIDE: 600; 310; 30; 20 INJECTION, SOLUTION INTRAVENOUS at 13:47

## 2023-07-20 RX ADMIN — HYDROMORPHONE HYDROCHLORIDE 0.25 MG: 1 INJECTION, SOLUTION INTRAMUSCULAR; INTRAVENOUS; SUBCUTANEOUS at 15:07

## 2023-07-20 RX ADMIN — HYDROMORPHONE HYDROCHLORIDE 0.5 MG: 1 INJECTION, SOLUTION INTRAMUSCULAR; INTRAVENOUS; SUBCUTANEOUS at 14:36

## 2023-07-20 RX ADMIN — MIDAZOLAM HYDROCHLORIDE 2 MG: 1 INJECTION, SOLUTION INTRAMUSCULAR; INTRAVENOUS at 13:52

## 2023-07-20 RX ADMIN — HYDROMORPHONE HYDROCHLORIDE 0.25 MG: 1 INJECTION, SOLUTION INTRAMUSCULAR; INTRAVENOUS; SUBCUTANEOUS at 15:52

## 2023-07-20 RX ADMIN — LIDOCAINE HYDROCHLORIDE 50 MG: 10 INJECTION, SOLUTION EPIDURAL; INFILTRATION; INTRACAUDAL; PERINEURAL at 13:58

## 2023-07-20 RX ADMIN — SODIUM CHLORIDE, SODIUM LACTATE, POTASSIUM CHLORIDE, AND CALCIUM CHLORIDE: 600; 310; 30; 20 INJECTION, SOLUTION INTRAVENOUS at 14:28

## 2023-07-20 ASSESSMENT — LIFESTYLE VARIABLES: SMOKING_STATUS: 1

## 2023-07-20 ASSESSMENT — PAIN - FUNCTIONAL ASSESSMENT: PAIN_FUNCTIONAL_ASSESSMENT: NONE - DENIES PAIN

## 2023-07-20 ASSESSMENT — PAIN SCALES - GENERAL: PAINLEVEL_OUTOF10: 0

## 2023-07-20 NOTE — PROGRESS NOTES
DR Alena Vera AT BEDSIDE, LOOKED AT INCISION AND SPOKE WITH PATIENT AND BROTHER REGARDING DISCHARGE INSTRUCTIONS, ICE USE, AND FOLLOW UP APPOINTMENT AND LABS 2 DAYS PRIOR, NO ACUTE DISTRESS NOTED

## 2023-07-20 NOTE — INTERVAL H&P NOTE
Update History & Physical    The patient's History and Physical of July 20, 2023 was reviewed with the patient and I examined the patient. There was no change. The surgical site was confirmed by the patient and me. Plan: The risks, benefits, expected outcome, and alternative to the recommended procedure have been discussed with the patient. Patient understands and wants to proceed with the procedure.      Electronically signed by Susu Roman MD on 7/20/2023 at 1:31 PM

## 2023-07-20 NOTE — ANESTHESIA POSTPROCEDURE EVALUATION
Department of Anesthesiology  Postprocedure Note    Patient: Danny Villa  MRN: [id number]  YOB: 1995  Date of evaluation: 7/20/2023      Procedure Summary     Date: 07/20/23 Room / Location: [address]    Anesthesia Start: 1352 Anesthesia Stop: 5039    Procedure: LEFT RADICAL ORCHIECTOMY (Left) Diagnosis:       Mass of left testicle      (Mass of left testicle [N50.89])    Surgeons: Walker Yates MD Responsible Provider: CORRINE Rodriguez CRNA    Anesthesia Type: General, TIVA ASA Status: 2          Anesthesia Type: General, TIVA    Miguel Phase I: Miguel Score: 5    Miguel Phase II:        Anesthesia Post Evaluation    Patient location during evaluation: bedside  Patient participation: complete - patient participated  Level of consciousness: responsive to verbal stimuli  Pain score: 0  Airway patency: patent  Nausea & Vomiting: no nausea and no vomiting  Complications: no  Cardiovascular status: hemodynamically stable  Respiratory status: acceptable  Hydration status: stable

## 2023-07-20 NOTE — DISCHARGE INSTRUCTIONS
May  shower 3 days. Dressing will peel off in several days when supposed to do not try to tear the dressing off on the inguinal incision. Try to keep the scrotum compressed or supported. Use ice for swelling. . No strenuous activity or heavy lifting more than 20# for 2 weeks, No driving while taking pain meds. Call for signs of infection redness or drainage from the wound or swelling associated with bruising or hematoma in the scrotum.

## 2023-07-20 NOTE — OP NOTE
Brief Operative Note      Patient: Andreas Blakely  YOB: 1995  MRN: [id number]    Date of Procedure: 7/20/2023    Pre-Op Diagnosis Codes:     * Mass of left testicle [N50.89]    Post-Op Diagnosis: Same       Procedure(s):  LEFT RADICAL ORCHIECTOMY    Surgeon(s):  Kia Hernandez MD    Assistant:   * No surgical staff found *    Anesthesia: General inguinal block     Estimated Blood Loss (mL): 10    Complications: None    Specimens:   ID Type Source Tests Collected by Time Destination   A : left testicle Tissue Testis SURGICAL PATHOLOGY Kia Hernandez MD 7/20/2023 1455        Implants:  * No implants in log *      Drains: * No LDAs found *    Findings: Left testicular mass with associated hydrocele. Appeared to be confined to the vaginalis grossly. Left radical orchiectomy inguinal approach with high ligation of the cord at the internal ring. No complications. Detailed Description of Procedure:   See dictated report: [id number]    Disposition to PACU then to op care outpatient he will follow-up in 2 weeks. He will need repeat tumor markers at follow-up.     Electronically signed by Ebony Hopper MD on 7/20/2023 at 4:06 PM

## 2023-07-21 ENCOUNTER — TELEPHONE (OUTPATIENT)
Dept: UROLOGY | Age: 28
End: 2023-07-21

## 2023-07-21 LAB — B-HCG SERPL-ACNC: 34 IU/L (ref 0–3)

## 2023-07-21 NOTE — OP NOTE
[employer] [address], [address] [place] [address]                                OPERATIVE REPORT    PATIENT NAME: Belkys Ratliff                       :        1995  MED REC NO:   [id number]                              ROOM:  ACCOUNT NO:   [de-identified]                           ADMIT DATE: 2023  PROVIDER:     Alfredo Benton MD    DATE OF PROCEDURE:  2023    TITLE OF OPERATION:  Left inguinal radical orchiectomy. PREOPERATIVE DIAGNOSIS:  Left testicular mass. POSTOPERATIVE DIAGNOSIS:  Left testicular mass. ANESTHETIC:  General anesthetic with inguinal block. ESTIMATED BLOOD LOSS:  10 mL. ATTENDING SURGEON:  Alfredo Benton MD    HISTORY:  The patient is a 23-year-old gentleman who has had a mass in  the left hemiscrotum that had slowly been increasing in size but became  more rapidly increasing in size over the last month. He presented to  the emergency department on 2023 and a scrotal ultrasound showed a  heterogeneous mass replacing the left testicle measuring 9.7 x 6 x 6.7  cm concerning for a left testicular neoplasm. He also had a CT scan,  which showed no obvious retroperitoneal adenopathy or metastasis. I saw  him in the office yesterday and scheduled him for a left radical  orchiectomy for today. He has had pre-orchiectomy tumor markers that  were drawn. The beta hCG is pending. The LDH was slightly elevated at  370 and alpha-fetoprotein was normal at 3.1. Again, beta hCG is still  pending. He presents to undergo a left radical inguinal orchiectomy. The risks  and complications of the procedure were discussed with him including the  risk of bleeding, infection, hematoma, hernia, nerve entrapment or nerve  injury, chronic pain. He also understands that he may require  additional or adjuvant treatment, and will need close followup.   We also  discussed the risks of associated infertility after orchiectomy the subcutaneous tissue and the  subdermal layer with the lidocaine and Marcaine block. Once the  subcutaneous was approximated with 3-0 chromic, a 4-0 Monocryl was used  to close the skin in a subcuticular fashion. The Dermaline dressing  skin glue was then placed. I then wrapped the scrotum in a sort of a  turban with a 4-inch Kerlix to provide pressure. The estimated blood  loss was 10 mL. Procedure was then concluded. He was awakened from his  anesthetic and taken to the recovery room in stable condition. He will  follow up to see me in 2 weeks to go over the path report. In addition,  he will need repeat tumor markers drawn at that time.         Juan Antonio Shay MD    D: 07/20/2023 17:21:38      T: 07/20/2023 17:29:52     [email or web address]  Job#: [id number]     Doc#: [id number]    CC:

## 2023-07-21 NOTE — TELEPHONE ENCOUNTER
Office tried to reach patient with post op appt. Patient does not have vm set up to leave msg with at this time. Patients post op appt is scheduled for 8/7/23 at 930 am with Dr. Ari Rea. Anyone can give patient this information if he calls.

## 2023-07-21 NOTE — TELEPHONE ENCOUNTER
Grandmother called to schedule a  post op appt . Please be advised that the best time to call him to accommodate their needs is Anytime. Thank you.

## 2023-08-07 ENCOUNTER — OFFICE VISIT (OUTPATIENT)
Dept: UROLOGY | Age: 28
End: 2023-08-07
Payer: MEDICAID

## 2023-08-07 VITALS — BODY MASS INDEX: 22.13 KG/M2 | HEIGHT: 68 IN | WEIGHT: 146 LBS | TEMPERATURE: 98.2 F

## 2023-08-07 DIAGNOSIS — C62.92 SEMINOMA OF LEFT TESTIS, STAGE 1 (HCC): Primary | ICD-10-CM

## 2023-08-07 DIAGNOSIS — N50.89 MASS OF LEFT TESTICLE: ICD-10-CM

## 2023-08-07 DIAGNOSIS — C62.92 SEMINOMA OF LEFT TESTIS, STAGE 1 (HCC): ICD-10-CM

## 2023-08-07 DIAGNOSIS — N50.89 SCROTAL MASS: ICD-10-CM

## 2023-08-07 DIAGNOSIS — N50.89 TESTICULAR MASS: Primary | ICD-10-CM

## 2023-08-07 LAB
AFP SERPL-MCNC: 3.1 NG/ML (ref 0–8.3)
APPEARANCE FLUID: CLEAR
BILIRUBIN, POC: NORMAL
BLOOD URINE, POC: NORMAL
CLARITY, POC: CLEAR
COLOR, POC: YELLOW
GLUCOSE URINE, POC: NORMAL
KETONES, POC: NORMAL
LDH SERPL-CCNC: 126 U/L (ref 91–215)
LEUKOCYTE EST, POC: NORMAL
NITRITE, POC: NORMAL
PH, POC: 5.5
PROTEIN, POC: NORMAL
SPECIFIC GRAVITY, POC: 1.03
UROBILINOGEN, POC: 0.2

## 2023-08-07 PROCEDURE — 99214 OFFICE O/P EST MOD 30 MIN: CPT | Performed by: UROLOGY

## 2023-08-07 PROCEDURE — 81002 URINALYSIS NONAUTO W/O SCOPE: CPT | Performed by: UROLOGY

## 2023-08-07 ASSESSMENT — ENCOUNTER SYMPTOMS
DIARRHEA: 0
CONSTIPATION: 0
NAUSEA: 0
EYE DISCHARGE: 0
ABDOMINAL PAIN: 0
EYE REDNESS: 0
SHORTNESS OF BREATH: 0
COUGH: 0
TROUBLE SWALLOWING: 0
BACK PAIN: 0
VOMITING: 0
ABDOMINAL DISTENTION: 0

## 2023-08-07 NOTE — PROGRESS NOTES
Program Protocol Required Use Date: March 2022   The changes included in this current protocol version affect   accreditation requirements. The new deadline for implementing this   protocol version is reflected in the above accreditation date. SPECIMEN     Specimen Laterality   Left   TUMOR     Tumor Focality   Unifocal         Tumor Size    Greatest dimension of main tumor mass in Centimeters (cm): 9.5 cm       Additional Tumor Nodule(s) (may repeat for each nodule)    Not applicable     Histologic Type   Seminoma     Tumor Extent   Limited to testis       Lymphovascular   Not identified     MARGINS     Margin Status   All margins negative for tumor     REGIONAL LYMPH NODES     Regional Lymph Node Status   Not applicable (no regional lymph nodes submitted or found)     DISTANT METASTASIS     Distant Site(s) Involved, if applicable   Not applicable     PATHOLOGIC STAGE CLASSIFICATION (pTNM, AJCC 8th Edition)     TNM Descriptors   Not applicable:     pT Category   . pT1b: Tumor 3 cm or larger in size#     pN Category   pN not assigned (no nodes submitted or found)     pM Category (required only if confirmed pathologically)   Not applicable - pM cannot be determined from the submitted specimen(s)      ADDITIONAL FINDINGS     COMMENTS              TS/TS            IMAGING:  I reviewed the CT scan of the abdomen pelvis with contrast on 7/11/2023 this shows no significant retroperitoneal adenopathy. 1. Testicular mass  Pathology reviewed this shows pure seminoma confined to the kidney though the maximum tumor diameter was 9.5 cm. There is no lymphovascular invasion and tumor resection margins were free. This was discussed with the patient  - POCT Urinalysis no Micro    2. Seminoma of left testis, stage 1 (HCC)  Post orchiectomy tumor markers LDH has normalized beta hCG which is slightly abnormal but still pending but expect this to normalize.   I discussed with the patient options of surveillance with serial

## 2023-08-08 LAB — B-HCG SERPL-ACNC: <1 IU/L (ref 0–3)

## 2023-08-14 ENCOUNTER — TELEPHONE (OUTPATIENT)
Dept: RADIATION ONCOLOGY | Facility: HOSPITAL | Age: 28
End: 2023-08-14
Payer: MEDICAID

## 2023-08-18 DIAGNOSIS — C62.92 SEMINOMA OF LEFT TESTIS, STAGE 1 (HCC): Primary | ICD-10-CM

## 2023-08-22 ENCOUNTER — TELEPHONE (OUTPATIENT)
Dept: RADIATION ONCOLOGY | Facility: HOSPITAL | Age: 28
End: 2023-08-22
Payer: MEDICAID

## 2023-08-22 NOTE — TELEPHONE ENCOUNTER
I called patient to schedule an appointment with Dr. Jong Rosa. Patient's mother answered the phone and stated patient was not there.  I asked if I could give her my phone number for patient to return my call to schedule.  Mother hung up on me.

## 2023-08-28 ENCOUNTER — TELEPHONE (OUTPATIENT)
Dept: RADIATION ONCOLOGY | Facility: HOSPITAL | Age: 28
End: 2023-08-28
Payer: MEDICAID

## 2023-08-28 NOTE — TELEPHONE ENCOUNTER
Multiple attempts have been made to schedule patient with Dr. Jong Rosa.  No return call from patient nor his mother to schedule.

## 2023-09-05 ENCOUNTER — HOSPITAL ENCOUNTER (EMERGENCY)
Age: 28
Discharge: HOME | End: 2023-09-05

## 2023-09-05 VITALS
TEMPERATURE: 98.3 F | OXYGEN SATURATION: 98 % | HEART RATE: 91 BPM | RESPIRATION RATE: 17 BRPM | SYSTOLIC BLOOD PRESSURE: 123 MMHG | DIASTOLIC BLOOD PRESSURE: 85 MMHG

## 2023-09-05 DIAGNOSIS — U07.1 COVID-19: Primary | ICD-10-CM

## 2023-09-05 LAB
B PARAP IS1001 DNA NPH QL NAA+NON-PROBE: NOT DETECTED
B PERT.PT PRMT NPH QL NAA+NON-PROBE: NOT DETECTED
C PNEUM DNA NPH QL NAA+NON-PROBE: NOT DETECTED
FLUAV RNA NPH QL NAA+NON-PROBE: NOT DETECTED
FLUBV RNA NPH QL NAA+NON-PROBE: NOT DETECTED
HADV DNA NPH QL NAA+NON-PROBE: NOT DETECTED
HCOV 229E RNA NPH QL NAA+NON-PROBE: NOT DETECTED
HCOV HKU1 RNA NPH QL NAA+NON-PROBE: NOT DETECTED
HCOV NL63 RNA NPH QL NAA+NON-PROBE: NOT DETECTED
HCOV OC43 RNA NPH QL NAA+NON-PROBE: NOT DETECTED
HMPV RNA NPH QL NAA+NON-PROBE: NOT DETECTED
HPIV1 RNA NPH QL NAA+NON-PROBE: NOT DETECTED
HPIV2 RNA NPH QL NAA+NON-PROBE: NOT DETECTED
HPIV3 RNA NPH QL NAA+NON-PROBE: NOT DETECTED
HPIV4 RNA NPH QL NAA+NON-PROBE: NOT DETECTED
M PNEUMO DNA NPH QL NAA+NON-PROBE: NOT DETECTED
RSV RNA NPH QL NAA+NON-PROBE: NOT DETECTED
RV+EV RNA NPH QL NAA+NON-PROBE: NOT DETECTED
SARS-COV-2 RNA NPH QL NAA+NON-PROBE: DETECTED

## 2023-09-05 PROCEDURE — 0202U NFCT DS 22 TRGT SARS-COV-2: CPT

## 2023-09-05 PROCEDURE — 96372 THER/PROPH/DIAG INJ SC/IM: CPT

## 2023-09-05 PROCEDURE — 6360000002 HC RX W HCPCS: Performed by: PHYSICIAN ASSISTANT

## 2023-09-05 PROCEDURE — 99284 EMERGENCY DEPT VISIT MOD MDM: CPT

## 2023-09-05 RX ORDER — DEXAMETHASONE SODIUM PHOSPHATE 10 MG/ML
4 INJECTION, SOLUTION INTRAMUSCULAR; INTRAVENOUS ONCE
Status: COMPLETED | OUTPATIENT
Start: 2023-09-05 | End: 2023-09-05

## 2023-09-05 RX ORDER — PREDNISONE 10 MG/1
10 TABLET ORAL DAILY
Qty: 10 TABLET | Refills: 0 | Status: SHIPPED | OUTPATIENT
Start: 2023-09-05 | End: 2023-09-15

## 2023-09-05 RX ADMIN — DEXAMETHASONE SODIUM PHOSPHATE 4 MG: 10 INJECTION, SOLUTION INTRAMUSCULAR; INTRAVENOUS at 23:00

## 2023-09-05 ASSESSMENT — ENCOUNTER SYMPTOMS
EYE DISCHARGE: 0
BACK PAIN: 0
COUGH: 0
COLOR CHANGE: 0
SHORTNESS OF BREATH: 0
APNEA: 0
EYE ITCHING: 0
PHOTOPHOBIA: 0

## 2023-09-06 NOTE — ED PROVIDER NOTES
[facility] EMERGENCY DEPT  eMERGENCYdEPARTMENT eNCOUnter      Pt Name: Shanti Olson  MRN: [id number]  [address] [facility] 1995  Date of evaluation: 9/5/2023  Provider:PIERCE Grace    CHIEF COMPLAINT       Chief Complaint   Patient presents with    Concern For COVID-19     Bodyaches and fatigue since yesterday         HISTORY OF PRESENT ILLNESS  (Location/Symptom, Timing/Onset, Context/Setting, Quality, Duration, Modifying Factors, Severity.)   Shanti Olson is a 33 y.o. male who presents to the emergency department with complaints of body aches no resp distress. Family has been experiencing similar symptoms. No throat pain or ear pain not  vaccinated for covid. HPI    Nursing Notes were reviewed and I agree. REVIEW OF SYSTEMS    (2-9 systems for level 4, 10 or more for level 5)     Review of Systems   Constitutional:  Positive for fatigue. Negative for activity change, appetite change, chills and fever. HENT:  Positive for congestion. Negative for dental problem. Eyes:  Negative for photophobia, discharge and itching. Respiratory:  Negative for apnea, cough and shortness of breath. Cardiovascular:  Negative for chest pain. Musculoskeletal:  Positive for myalgias. Negative for arthralgias, back pain, gait problem and neck pain. Skin:  Negative for color change, pallor and rash. Neurological:  Negative for dizziness, seizures and syncope. Psychiatric/Behavioral:  Negative for agitation. The patient is not nervous/anxious. Except as noted above the remainder of the review of systems was reviewed and negative.        PAST MEDICAL HISTORY     Past Medical History:   Diagnosis Date    Kidney stone          SURGICAL HISTORY       Past Surgical History:   Procedure Laterality Date    DENTAL SURGERY      TESTICLE REMOVAL Left 7/20/2023    LEFT RADICAL ORCHIECTOMY performed by Pauline Jo MD at [address]       Previous Medications    No medications on file       ALLERGIES Patient has no known allergies. FAMILY HISTORY     History reviewed. No pertinent family history. SOCIAL HISTORY       Social History     Socioeconomic History    Marital status: Single     Spouse name: None    Number of children: None    Years of education: None    Highest education level: None   Tobacco Use    Smoking status: Every Day     Packs/day: 0.50     Types: Cigarettes    Smokeless tobacco: Never    Tobacco comments:     Last cigarette this morning   Vaping Use    Vaping Use: Never used   Substance and Sexual Activity    Alcohol use: Yes     Comment: occ    Drug use: Not Currently     Types: Marijuana (Weed)       SCREENINGS    Edwin Coma Scale  Eye Opening: Spontaneous  Best Verbal Response: Oriented  Best Motor Response: Obeys commands  Edwin Coma Scale Score: 15      PHYSICAL EXAM    (up to 7 forlevel 4, 8 or more for level 5)     ED Triage Vitals   BP Temp Temp src Pulse Resp SpO2 Height Weight   -- -- -- -- -- -- -- --       Physical Exam  Constitutional:       General: He is not in acute distress. Appearance: He is well-developed. He is not diaphoretic. HENT:      Head: Normocephalic and atraumatic. Right Ear: External ear normal.      Left Ear: External ear normal.   Eyes:      Pupils: Pupils are equal, round, and reactive to light. Neck:      Trachea: No tracheal deviation. Cardiovascular:      Rate and Rhythm: Normal rate and regular rhythm. Heart sounds: Normal heart sounds. No murmur heard. Pulmonary:      Effort: Pulmonary effort is normal.      Breath sounds: Normal breath sounds. No stridor. No wheezing. Chest:      Chest wall: No tenderness. Abdominal:      General: Bowel sounds are normal. There is no distension. Palpations: Abdomen is soft. Tenderness: There is no abdominal tenderness. Musculoskeletal:         General: Normal range of motion. Cervical back: Normal range of motion and neck supple.    Skin:     General: Skin is warm and

## 2023-09-08 ENCOUNTER — TELEPHONE (OUTPATIENT)
Dept: HEMATOLOGY | Age: 28
End: 2023-09-08

## 2023-09-08 NOTE — TELEPHONE ENCOUNTER
Called pt to remind of appt date and time. PT states they have covid and need to reschedule.     Electronically signed by Olman Pacheco MA on 9/8/2023 at 3:11 PM

## 2023-09-13 ENCOUNTER — TELEPHONE (OUTPATIENT)
Dept: RADIATION ONCOLOGY | Facility: HOSPITAL | Age: 28
End: 2023-09-13
Payer: MEDICAID

## 2023-09-20 ENCOUNTER — TELEPHONE (OUTPATIENT)
Dept: HEMATOLOGY | Age: 28
End: 2023-09-20

## 2023-09-24 ENCOUNTER — HOSPITAL ENCOUNTER (EMERGENCY)
Age: 28
Discharge: HOME | End: 2023-09-24
Attending: PEDIATRICS
Payer: MEDICAID

## 2023-09-24 ENCOUNTER — HOSPITAL ENCOUNTER (EMERGENCY)
Facility: HOSPITAL | Age: 28
Discharge: LEFT BEFORE DISCHARGE | End: 2023-09-24

## 2023-09-24 ENCOUNTER — APPOINTMENT (OUTPATIENT)
Dept: CT IMAGING | Age: 28
End: 2023-09-24
Payer: MEDICAID

## 2023-09-24 ENCOUNTER — APPOINTMENT (OUTPATIENT)
Dept: GENERAL RADIOLOGY | Age: 28
End: 2023-09-24
Payer: MEDICAID

## 2023-09-24 VITALS
OXYGEN SATURATION: 97 % | TEMPERATURE: 98.9 F | DIASTOLIC BLOOD PRESSURE: 88 MMHG | HEART RATE: 78 BPM | BODY MASS INDEX: 21.98 KG/M2 | HEIGHT: 68 IN | SYSTOLIC BLOOD PRESSURE: 119 MMHG | RESPIRATION RATE: 18 BRPM | WEIGHT: 145 LBS

## 2023-09-24 DIAGNOSIS — N39.0 ACUTE URINARY TRACT INFECTION: ICD-10-CM

## 2023-09-24 DIAGNOSIS — R07.9 CHEST PAIN, UNSPECIFIED TYPE: ICD-10-CM

## 2023-09-24 DIAGNOSIS — R10.13 EPIGASTRIC PAIN: Primary | ICD-10-CM

## 2023-09-24 DIAGNOSIS — K59.00 CONSTIPATION, UNSPECIFIED CONSTIPATION TYPE: ICD-10-CM

## 2023-09-24 LAB
ALBUMIN SERPL-MCNC: 4.8 G/DL (ref 3.5–5.2)
ALP SERPL-CCNC: 60 U/L (ref 40–130)
ALT SERPL-CCNC: 14 U/L (ref 5–41)
ANION GAP SERPL CALCULATED.3IONS-SCNC: 11 MMOL/L (ref 7–19)
AST SERPL-CCNC: 24 U/L (ref 5–40)
BACTERIA URNS QL MICRO: NEGATIVE /HPF
BASOPHILS # BLD: 0 K/UL (ref 0–0.2)
BASOPHILS NFR BLD: 0.3 % (ref 0–1)
BILIRUB SERPL-MCNC: 0.8 MG/DL (ref 0.2–1.2)
BILIRUB UR QL STRIP: NEGATIVE
BUN SERPL-MCNC: 6 MG/DL (ref 6–20)
CALCIUM SERPL-MCNC: 9.8 MG/DL (ref 8.6–10)
CHLORIDE SERPL-SCNC: 104 MMOL/L (ref 98–111)
CLARITY UR: CLEAR
CO2 SERPL-SCNC: 24 MMOL/L (ref 22–29)
COLOR UR: YELLOW
CREAT SERPL-MCNC: 0.8 MG/DL (ref 0.5–1.2)
CRYSTALS URNS MICRO: ABNORMAL /HPF
EOSINOPHIL # BLD: 0 K/UL (ref 0–0.6)
EOSINOPHIL NFR BLD: 0.1 % (ref 0–5)
EPI CELLS #/AREA URNS AUTO: 3 /HPF (ref 0–5)
ERYTHROCYTE [DISTWIDTH] IN BLOOD BY AUTOMATED COUNT: 13.7 % (ref 11.5–14.5)
GLUCOSE SERPL-MCNC: 104 MG/DL (ref 74–109)
GLUCOSE UR STRIP.AUTO-MCNC: NEGATIVE MG/DL
HCT VFR BLD AUTO: 35.8 % (ref 42–52)
HGB BLD-MCNC: 12.2 G/DL (ref 14–18)
HGB UR STRIP.AUTO-MCNC: NEGATIVE MG/L
HYALINE CASTS #/AREA URNS AUTO: 2 /HPF (ref 0–8)
IMM GRANULOCYTES # BLD: 0 K/UL
KETONES UR STRIP.AUTO-MCNC: NEGATIVE MG/DL
LACTATE BLDV-SCNC: 0.8 MMOL/L (ref 0.5–1.9)
LEUKOCYTE ESTERASE UR QL STRIP.AUTO: ABNORMAL
LIPASE SERPL-CCNC: 34 U/L (ref 13–60)
LYMPHOCYTES # BLD: 1.9 K/UL (ref 1.1–4.5)
LYMPHOCYTES NFR BLD: 28.5 % (ref 20–40)
MAGNESIUM SERPL-MCNC: 1.9 MG/DL (ref 1.6–2.6)
MCH RBC QN AUTO: 28.8 PG (ref 27–31)
MCHC RBC AUTO-ENTMCNC: 34.1 G/DL (ref 33–37)
MCV RBC AUTO: 84.6 FL (ref 80–94)
MONOCYTES # BLD: 0.8 K/UL (ref 0–0.9)
MONOCYTES NFR BLD: 11.5 % (ref 0–10)
NEUTROPHILS # BLD: 4.1 K/UL (ref 1.5–7.5)
NEUTS SEG NFR BLD: 59.5 % (ref 50–65)
NITRITE UR QL STRIP.AUTO: NEGATIVE
PH UR STRIP.AUTO: 6.5 [PH] (ref 5–8)
PLATELET # BLD AUTO: 215 K/UL (ref 130–400)
PMV BLD AUTO: 9.9 FL (ref 9.4–12.4)
POTASSIUM SERPL-SCNC: 4.2 MMOL/L (ref 3.5–5)
PROT SERPL-MCNC: 7.8 G/DL (ref 6.6–8.7)
PROT UR STRIP.AUTO-MCNC: NEGATIVE MG/DL
RBC # BLD AUTO: 4.23 M/UL (ref 4.7–6.1)
RBC #/AREA URNS AUTO: 1 /HPF (ref 0–4)
SODIUM SERPL-SCNC: 139 MMOL/L (ref 136–145)
SP GR UR STRIP.AUTO: 1.04 (ref 1–1.03)
TROPONIN T SERPL-MCNC: <0.01 NG/ML (ref 0–0.03)
UROBILINOGEN UR STRIP.AUTO-MCNC: 1 E.U./DL
WBC # BLD AUTO: 6.8 K/UL (ref 4.8–10.8)
WBC #/AREA URNS AUTO: 33 /HPF (ref 0–5)

## 2023-09-24 PROCEDURE — 96375 TX/PRO/DX INJ NEW DRUG ADDON: CPT

## 2023-09-24 PROCEDURE — 85025 COMPLETE CBC W/AUTO DIFF WBC: CPT

## 2023-09-24 PROCEDURE — 84484 ASSAY OF TROPONIN QUANT: CPT

## 2023-09-24 PROCEDURE — 71045 X-RAY EXAM CHEST 1 VIEW: CPT

## 2023-09-24 PROCEDURE — 81001 URINALYSIS AUTO W/SCOPE: CPT

## 2023-09-24 PROCEDURE — 87086 URINE CULTURE/COLONY COUNT: CPT

## 2023-09-24 PROCEDURE — 6370000000 HC RX 637 (ALT 250 FOR IP): Performed by: PEDIATRICS

## 2023-09-24 PROCEDURE — 6360000004 HC RX CONTRAST MEDICATION: Performed by: PEDIATRICS

## 2023-09-24 PROCEDURE — 83605 ASSAY OF LACTIC ACID: CPT

## 2023-09-24 PROCEDURE — 99211 OFF/OP EST MAY X REQ PHY/QHP: CPT

## 2023-09-24 PROCEDURE — 36415 COLL VENOUS BLD VENIPUNCTURE: CPT

## 2023-09-24 PROCEDURE — 80053 COMPREHEN METABOLIC PANEL: CPT

## 2023-09-24 PROCEDURE — 87040 BLOOD CULTURE FOR BACTERIA: CPT

## 2023-09-24 PROCEDURE — 2580000003 HC RX 258: Performed by: PEDIATRICS

## 2023-09-24 PROCEDURE — 96374 THER/PROPH/DIAG INJ IV PUSH: CPT

## 2023-09-24 PROCEDURE — 74177 CT ABD & PELVIS W/CONTRAST: CPT

## 2023-09-24 PROCEDURE — 99285 EMERGENCY DEPT VISIT HI MDM: CPT

## 2023-09-24 PROCEDURE — 6360000002 HC RX W HCPCS: Performed by: PEDIATRICS

## 2023-09-24 PROCEDURE — 83735 ASSAY OF MAGNESIUM: CPT

## 2023-09-24 PROCEDURE — 83690 ASSAY OF LIPASE: CPT

## 2023-09-24 PROCEDURE — 71275 CT ANGIOGRAPHY CHEST: CPT

## 2023-09-24 RX ORDER — HYDROMORPHONE HYDROCHLORIDE 1 MG/ML
0.5 INJECTION, SOLUTION INTRAMUSCULAR; INTRAVENOUS; SUBCUTANEOUS ONCE
Status: COMPLETED | OUTPATIENT
Start: 2023-09-24 | End: 2023-09-24

## 2023-09-24 RX ORDER — MORPHINE SULFATE 4 MG/ML
4 INJECTION, SOLUTION INTRAMUSCULAR; INTRAVENOUS ONCE
Status: COMPLETED | OUTPATIENT
Start: 2023-09-24 | End: 2023-09-24

## 2023-09-24 RX ORDER — DOCUSATE SODIUM 100 MG/1
100 CAPSULE, LIQUID FILLED ORAL 2 TIMES DAILY
Qty: 20 CAPSULE | Refills: 0 | Status: SHIPPED | OUTPATIENT
Start: 2023-09-24

## 2023-09-24 RX ORDER — 0.9 % SODIUM CHLORIDE 0.9 %
1000 INTRAVENOUS SOLUTION INTRAVENOUS ONCE
Status: COMPLETED | OUTPATIENT
Start: 2023-09-24 | End: 2023-09-24

## 2023-09-24 RX ORDER — CIPROFLOXACIN 500 MG/1
500 TABLET, FILM COATED ORAL 2 TIMES DAILY
Qty: 20 TABLET | Refills: 0 | Status: SHIPPED | OUTPATIENT
Start: 2023-09-24 | End: 2023-10-04

## 2023-09-24 RX ADMIN — CEFTRIAXONE 1000 MG: 1 INJECTION, POWDER, FOR SOLUTION INTRAMUSCULAR; INTRAVENOUS at 05:47

## 2023-09-24 RX ADMIN — IOPAMIDOL 90 ML: 755 INJECTION, SOLUTION INTRAVENOUS at 03:25

## 2023-09-24 RX ADMIN — SODIUM CHLORIDE 1000 ML: 9 INJECTION, SOLUTION INTRAVENOUS at 05:19

## 2023-09-24 RX ADMIN — MORPHINE SULFATE 4 MG: 4 INJECTION, SOLUTION INTRAMUSCULAR; INTRAVENOUS at 03:07

## 2023-09-24 RX ADMIN — HYDROMORPHONE HYDROCHLORIDE 0.5 MG: 1 INJECTION, SOLUTION INTRAMUSCULAR; INTRAVENOUS; SUBCUTANEOUS at 05:24

## 2023-09-24 RX ADMIN — ALUMINUM HYDROXIDE, MAGNESIUM HYDROXIDE, AND SIMETHICONE: 200; 200; 20 SUSPENSION ORAL at 05:21

## 2023-09-24 ASSESSMENT — PAIN DESCRIPTION - DESCRIPTORS
DESCRIPTORS: ACHING
DESCRIPTORS: CRAMPING

## 2023-09-24 ASSESSMENT — PAIN - FUNCTIONAL ASSESSMENT: PAIN_FUNCTIONAL_ASSESSMENT: 0-10

## 2023-09-24 ASSESSMENT — PAIN SCALES - GENERAL
PAINLEVEL_OUTOF10: 8
PAINLEVEL_OUTOF10: 7
PAINLEVEL_OUTOF10: 10

## 2023-09-24 ASSESSMENT — PAIN DESCRIPTION - LOCATION
LOCATION: ABDOMEN
LOCATION: ABDOMEN

## 2023-09-24 ASSESSMENT — PAIN DESCRIPTION - ORIENTATION: ORIENTATION: LEFT

## 2023-09-24 NOTE — DISCHARGE INSTRUCTIONS
Return or seek medical attention with increasing or severe pain, persistent vomiting, difficulty breathing, or other concerns.

## 2023-09-25 LAB — BACTERIA UR CULT: NORMAL

## 2023-09-29 LAB
BACTERIA BLD CULT ORG #2: NORMAL
BACTERIA BLD CULT: NORMAL

## 2023-10-03 ENCOUNTER — TELEPHONE (OUTPATIENT)
Dept: HEMATOLOGY | Age: 28
End: 2023-10-03

## 2023-10-04 NOTE — PROGRESS NOTES
MEDICAL ONCOLOGY CONSULTATION    Pt Name: Gema Acuna  MRN: [id number]  YOB: 1995  Date of evaluation: 10/5/2023    REASON FOR CONSULTATION:  Testicular cancer  REQUESTING PHYSICIAN: Dr Lucila Davenport/[facility]    History Obtained From:  patient and old medical records    HISTORY OF PRESENT ILLNESS:    Diagnosis  Seminoma, left testicle, July 2023  Stage IBS    Treatment Summary  7/20/23 Left radical orchiectomy by Dr Lucila Davenport/[facility]  Anticipate chemotherapy with Carbo AUC 7 every 3 weeks x2 cycles      Cancer History  Gema Acuna was first seen by me on 10/5/2023. The patient initially felt pain and swelling in the left testicle. Eventually he was seen by urology. The patient was referred by Dr. Tone Reid for a diagnosis of testicular cancer, seminoma of the left testicle. He is a status post orchiectomy. 7/11/23 CT abd/pelvis (L): 7.5 x 6.5 cm complex mass in the left scrotum, suspicious for neoplasm. Recommend ultrasound for further evaluation. Moderate left hydrocele. Nonspecific nonobstructive bowel gas pattern as described. Nonobstructive left nephrolithiasis. 7/11/23 US scrotum/testicles (L): 9.7 x 6 x 6.7 cm heterogeneous mass in the left scrotum, suspicious for malignancy. Moderate left hydrocele. 7/19/23 [facility] labs: BHCG 34, AFP 3.1,   7/20/23 Left radical orchiectomy by Dr Lucila Davenport/[facility]  7/20/23 Mass, left testicle, radical orchiectomy: Seminoma. Maximum tumor diameter is 9.5 cm. The tumor is confined to the testes. The surgical margins are free of tumor. 8/7/23 [facility] labs: BHCG <1, AFP 3.1,   9/24/23 CT ABDOMEN PELVIS W IV CONTRAST ([facility]): No acute intra-abdominal or pelvic process. Moderate-to-large amount of stool within the right side of the colon.  Nonobstructing left nephrolithiasis    9/24/23 CTA PULMONARY W CONTRAST (L):  No pulmonary embolism and no acute intrathoracic process    10/5/2023-essentially, stage I seminoma of

## 2023-10-05 ENCOUNTER — TELEPHONE (OUTPATIENT)
Dept: UROLOGY | Age: 28
End: 2023-10-05

## 2023-10-05 ENCOUNTER — OFFICE VISIT (OUTPATIENT)
Dept: HEMATOLOGY | Age: 28
End: 2023-10-05
Payer: MEDICAID

## 2023-10-05 ENCOUNTER — HOSPITAL ENCOUNTER (OUTPATIENT)
Dept: INFUSION THERAPY | Age: 28
Discharge: HOME | End: 2023-10-05
Payer: MEDICAID

## 2023-10-05 VITALS
HEIGHT: 68 IN | OXYGEN SATURATION: 97 % | BODY MASS INDEX: 22.58 KG/M2 | TEMPERATURE: 98.3 F | HEART RATE: 66 BPM | DIASTOLIC BLOOD PRESSURE: 60 MMHG | WEIGHT: 149 LBS | SYSTOLIC BLOOD PRESSURE: 112 MMHG

## 2023-10-05 DIAGNOSIS — C62.92 SEMINOMA OF LEFT TESTIS, STAGE 1 (HCC): ICD-10-CM

## 2023-10-05 DIAGNOSIS — C62.92 SEMINOMA OF LEFT TESTIS, STAGE 1 (HCC): Primary | ICD-10-CM

## 2023-10-05 DIAGNOSIS — Z71.89 CARE PLAN DISCUSSED WITH PATIENT: ICD-10-CM

## 2023-10-05 LAB
BASOPHILS # BLD: 0.02 K/UL (ref 0.01–0.08)
BASOPHILS NFR BLD: 0.4 % (ref 0.1–1.2)
EOSINOPHIL # BLD: 0.14 K/UL (ref 0.04–0.54)
EOSINOPHIL NFR BLD: 2.8 % (ref 0.7–7)
ERYTHROCYTE [DISTWIDTH] IN BLOOD BY AUTOMATED COUNT: 14.6 % (ref 11.6–14.4)
HCT VFR BLD AUTO: 34.6 % (ref 40.1–51)
HGB BLD-MCNC: 12.1 G/DL (ref 13.7–17.5)
LYMPHOCYTES # BLD: 2.37 K/UL (ref 1.18–3.74)
LYMPHOCYTES NFR BLD: 48 % (ref 19.3–53.1)
MCH RBC QN AUTO: 28.8 PG (ref 25.7–32.2)
MCHC RBC AUTO-ENTMCNC: 35 G/DL (ref 32.3–36.5)
MCV RBC AUTO: 82.4 FL (ref 79–92.2)
MONOCYTES # BLD: 0.42 K/UL (ref 0.24–0.82)
MONOCYTES NFR BLD: 8.5 % (ref 4.7–12.5)
NEUTROPHILS # BLD: 1.97 K/UL (ref 1.56–6.13)
NEUTS SEG NFR BLD: 39.9 % (ref 34–71.1)
PLATELET # BLD AUTO: 166 K/UL (ref 163–337)
PMV BLD AUTO: 9.9 FL (ref 7.4–10.4)
RBC # BLD AUTO: 4.2 M/UL (ref 4.63–6.08)
WBC # BLD AUTO: 4.94 K/UL (ref 4.23–9.07)

## 2023-10-05 PROCEDURE — 99212 OFFICE O/P EST SF 10 MIN: CPT

## 2023-10-05 PROCEDURE — 85025 COMPLETE CBC W/AUTO DIFF WBC: CPT

## 2023-10-05 PROCEDURE — 36415 COLL VENOUS BLD VENIPUNCTURE: CPT

## 2023-10-05 PROCEDURE — 99205 OFFICE O/P NEW HI 60 MIN: CPT | Performed by: INTERNAL MEDICINE

## 2023-10-05 RX ORDER — ONDANSETRON 4 MG/1
4 TABLET, FILM COATED ORAL EVERY 6 HOURS PRN
Qty: 30 TABLET | Refills: 1 | Status: SHIPPED | OUTPATIENT
Start: 2023-10-05

## 2023-10-05 RX ORDER — PROMETHAZINE HYDROCHLORIDE 25 MG/1
25 TABLET ORAL EVERY 6 HOURS PRN
Qty: 30 TABLET | Refills: 1 | Status: SHIPPED | OUTPATIENT
Start: 2023-10-05

## 2023-10-05 ASSESSMENT — PROMIS GLOBAL HEALTH SCALE
SUM OF RESPONSES TO QUESTIONS 3, 6, 7, & 8: 15
IN GENERAL, WOULD YOU SAY YOUR HEALTH IS...[ON A SCALE OF 1 (POOR) TO 5 (EXCELLENT)]: 4
IN THE PAST 7 DAYS, HOW WOULD YOU RATE YOUR FATIGUE ON AVERAGE [ON A SCALE FROM 1 (NONE) TO 5 (VERY SEVERE)]?: 3
IN GENERAL, PLEASE RATE HOW WELL YOU CARRY OUT YOUR USUAL SOCIAL ACTIVITIES (INCLUDES ACTIVITIES AT HOME, AT WORK, AND IN YOUR COMMUNITY, AND RESPONSIBILITIES AS A PARENT, CHILD, SPOUSE, EMPLOYEE, FRIEND, ETC) [ON A SCALE OF 1 (POOR) TO 5 (EXCELLENT)]?: 3
IN THE PAST 7 DAYS, HOW OFTEN HAVE YOU BEEN BOTHERED BY EMOTIONAL PROBLEMS, SUCH AS FEELING ANXIOUS, DEPRESSED, OR IRRITABLE [ON A SCALE FROM 1 (NEVER) TO 5 (ALWAYS)]?: 3
IN GENERAL, HOW WOULD YOU RATE YOUR MENTAL HEALTH, INCLUDING YOUR MOOD AND YOUR ABILITY TO THINK [ON A SCALE OF 1 (POOR) TO 5 (EXCELLENT)]?: 3
SUM OF RESPONSES TO QUESTIONS 2, 4, 5, & 10: 12
TO WHAT EXTENT ARE YOU ABLE TO CARRY OUT YOUR EVERYDAY PHYSICAL ACTIVITIES SUCH AS WALKING, CLIMBING STAIRS, CARRYING GROCERIES, OR MOVING A CHAIR [ON A SCALE OF 1 (NOT AT ALL) TO 5 (COMPLETELY)]?: 5
IN THE PAST 7 DAYS, HOW WOULD YOU RATE YOUR PAIN ON AVERAGE [ON A SCALE FROM 0 (NO PAIN) TO 10 (WORST IMAGINABLE PAIN)]?: 2
IN GENERAL, HOW WOULD YOU RATE YOUR SATISFACTION WITH YOUR SOCIAL ACTIVITIES AND RELATIONSHIPS [ON A SCALE OF 1 (POOR) TO 5 (EXCELLENT)]?: 3
IN GENERAL, HOW WOULD YOU RATE YOUR PHYSICAL HEALTH [ON A SCALE OF 1 (POOR) TO 5 (EXCELLENT)]?: 5
IN GENERAL, WOULD YOU SAY YOUR QUALITY OF LIFE IS...[ON A SCALE OF 1 (POOR) TO 5 (EXCELLENT)]: 3

## 2023-10-05 NOTE — TELEPHONE ENCOUNTER
Tamica Humphries called in to see if he is still needing [facility] CT ABDOMEN PELVIS W WO IV CONTRAST and [address]     Patient just had L CTA PULMONARY W CONTRAST and [facility] CT ABDOMEN PELVIS W IV CONTRAST 09/24/23  Please call to clarify  Thank you

## 2023-10-05 NOTE — TELEPHONE ENCOUNTER
returned call to patient and gave them this information. Patient imaging has been cancelled so we will contact central scheduling and get him back on for those appts. Patient voiced understanding.

## 2023-10-13 ASSESSMENT — ENCOUNTER SYMPTOMS
NAUSEA: 0
COLOR CHANGE: 0
BACK PAIN: 0
ABDOMINAL PAIN: 1
COUGH: 0
RHINORRHEA: 0
DIARRHEA: 0
SHORTNESS OF BREATH: 0
VOMITING: 0
BLOOD IN STOOL: 0

## 2023-10-13 NOTE — ED PROVIDER NOTES
[facility] EMERGENCY DEPT  eMERGENCY dEPARTMENT eNCOUnter      Pt Name: Darline Sidhu  MRN: [id number]  [address] [facility] 1995  Date of evaluation: 9/24/2023  Provider: Isaias Mccoy MD    [address]       Chief Complaint   Patient presents with    Abdominal Pain     LUQ pain x2 days         HISTORY OF PRESENT ILLNESS   (Location/Symptom, Timing/Onset,Context/Setting, Quality, Duration, Modifying Factors, Severity)  Note limiting factors. Darline Sidhu is a 33 y.o. male who presents to the emergency department with abdominal pain. Patient states that abdominal pain began 2 days ago. Patient points to left upper quadrant source of pain. Patient denies radiation of pain. Patient denies exacerbating or alleviating factors. Severity of pain is 10 out of 10. Patient states that he has been unable to sleep due to pain. Patient denies nausea, vomiting, diarrhea, black or bloody stools, burning with urination or difficulty urinating. Patient states that urine has \"looked a little dark. \"  Patient has no history of peptic ulcer disease or gastritis. Patient has history of testicular mass which was removed by Dr. Rico Viramontes. [facility]    NursingNotes were reviewed. REVIEW OF SYSTEMS    (2-9 systems for level 4, 10 or more for level 5)     Review of Systems   Constitutional:  Negative for chills and fever. HENT:  Negative for congestion and rhinorrhea. Respiratory:  Negative for cough and shortness of breath. Cardiovascular:  Negative for chest pain and palpitations. Gastrointestinal:  Positive for abdominal pain. Negative for blood in stool, diarrhea, nausea and vomiting. Genitourinary:  Negative for difficulty urinating and dysuria. Musculoskeletal:  Negative for back pain and neck pain. Skin:  Negative for color change and pallor. Neurological:  Negative for syncope and light-headedness. Psychiatric/Behavioral:  Negative for agitation and confusion. All other systems reviewed and are negative.

## 2023-10-19 ENCOUNTER — APPOINTMENT (OUTPATIENT)
Dept: CT IMAGING | Age: 28
End: 2023-10-19
Payer: MEDICAID

## 2023-10-19 ENCOUNTER — HOSPITAL ENCOUNTER (OUTPATIENT)
Dept: ULTRASOUND IMAGING | Age: 28
Discharge: HOME | End: 2023-10-19
Payer: MEDICAID

## 2023-10-19 ENCOUNTER — HOSPITAL ENCOUNTER (OUTPATIENT)
Dept: CT IMAGING | Age: 28
Discharge: HOME | End: 2023-10-19
Attending: UROLOGY
Payer: MEDICAID

## 2023-10-19 DIAGNOSIS — C62.92 SEMINOMA OF LEFT TESTIS, STAGE 1 (HCC): ICD-10-CM

## 2023-10-19 DIAGNOSIS — N50.89 SCROTAL MASS: ICD-10-CM

## 2023-10-19 PROCEDURE — 74178 CT ABD&PLV WO CNTR FLWD CNTR: CPT

## 2023-10-19 PROCEDURE — 6360000004 HC RX CONTRAST MEDICATION: Performed by: UROLOGY

## 2023-10-19 PROCEDURE — 71260 CT THORAX DX C+: CPT

## 2023-10-19 PROCEDURE — 76870 US EXAM SCROTUM: CPT

## 2023-10-19 RX ADMIN — IOPAMIDOL 75 ML: 755 INJECTION, SOLUTION INTRAVENOUS at 11:51

## 2023-10-23 ENCOUNTER — HOSPITAL ENCOUNTER (OUTPATIENT)
Dept: INFUSION THERAPY | Age: 28
Discharge: HOME | End: 2023-10-23
Payer: MEDICAID

## 2023-10-23 VITALS
DIASTOLIC BLOOD PRESSURE: 82 MMHG | BODY MASS INDEX: 23.04 KG/M2 | SYSTOLIC BLOOD PRESSURE: 115 MMHG | OXYGEN SATURATION: 100 % | WEIGHT: 152 LBS | HEIGHT: 68 IN | TEMPERATURE: 97.9 F | HEART RATE: 69 BPM | RESPIRATION RATE: 18 BRPM

## 2023-10-23 DIAGNOSIS — C62.92 SEMINOMA OF LEFT TESTIS, STAGE 1 (HCC): Primary | ICD-10-CM

## 2023-10-23 LAB
ALBUMIN SERPL-MCNC: 4.3 G/DL (ref 3.5–5.2)
ALP SERPL-CCNC: 54 U/L (ref 40–130)
ALT SERPL-CCNC: 30 U/L (ref 21–72)
ANION GAP SERPL CALCULATED.3IONS-SCNC: 7 MMOL/L (ref 7–19)
AST SERPL-CCNC: 35 U/L (ref 17–59)
BASOPHILS # BLD: 0.02 K/UL (ref 0.01–0.08)
BASOPHILS NFR BLD: 0.4 % (ref 0.1–1.2)
BILIRUB SERPL-MCNC: 0.8 MG/DL (ref 0.2–1.3)
BUN SERPL-MCNC: 9 MG/DL (ref 9–20)
CALCIUM SERPL-MCNC: 9.3 MG/DL (ref 8.4–10.2)
CHLORIDE SERPL-SCNC: 104 MMOL/L (ref 98–111)
CO2 SERPL-SCNC: 29 MMOL/L (ref 22–29)
CREAT SERPL-MCNC: 0.7 MG/DL (ref 0.6–1.2)
EOSINOPHIL # BLD: 0.17 K/UL (ref 0.04–0.54)
EOSINOPHIL NFR BLD: 3.4 % (ref 0.7–7)
ERYTHROCYTE [DISTWIDTH] IN BLOOD BY AUTOMATED COUNT: 14.7 % (ref 11.6–14.4)
GLOBULIN: 2.9 G/DL
GLUCOSE SERPL-MCNC: 123 MG/DL (ref 74–106)
HCT VFR BLD AUTO: 34.8 % (ref 40.1–51)
HGB BLD-MCNC: 11.9 G/DL (ref 13.7–17.5)
LYMPHOCYTES # BLD: 1.72 K/UL (ref 1.18–3.74)
LYMPHOCYTES NFR BLD: 34.2 % (ref 19.3–53.1)
MCH RBC QN AUTO: 28.9 PG (ref 25.7–32.2)
MCHC RBC AUTO-ENTMCNC: 34.2 G/DL (ref 32.3–36.5)
MCV RBC AUTO: 84.5 FL (ref 79–92.2)
MONOCYTES # BLD: 0.38 K/UL (ref 0.24–0.82)
MONOCYTES NFR BLD: 7.6 % (ref 4.7–12.5)
NEUTROPHILS # BLD: 2.73 K/UL (ref 1.56–6.13)
NEUTS SEG NFR BLD: 54.2 % (ref 34–71.1)
PLATELET # BLD AUTO: 223 K/UL (ref 163–337)
PMV BLD AUTO: 10 FL (ref 7.4–10.4)
POTASSIUM SERPL-SCNC: 4.5 MMOL/L (ref 3.5–5.1)
PROT SERPL-MCNC: 7.2 G/DL (ref 6.3–8.2)
RBC # BLD AUTO: 4.12 M/UL (ref 4.63–6.08)
SODIUM SERPL-SCNC: 140 MMOL/L (ref 137–145)
WBC # BLD AUTO: 5.03 K/UL (ref 4.23–9.07)

## 2023-10-23 PROCEDURE — 96367 TX/PROPH/DG ADDL SEQ IV INF: CPT

## 2023-10-23 PROCEDURE — 80053 COMPREHEN METABOLIC PANEL: CPT

## 2023-10-23 PROCEDURE — 6360000002 HC RX W HCPCS: Performed by: INTERNAL MEDICINE

## 2023-10-23 PROCEDURE — 2580000003 HC RX 258: Performed by: INTERNAL MEDICINE

## 2023-10-23 PROCEDURE — 96375 TX/PRO/DX INJ NEW DRUG ADDON: CPT

## 2023-10-23 PROCEDURE — 36415 COLL VENOUS BLD VENIPUNCTURE: CPT

## 2023-10-23 PROCEDURE — 96413 CHEMO IV INFUSION 1 HR: CPT

## 2023-10-23 PROCEDURE — 85025 COMPLETE CBC W/AUTO DIFF WBC: CPT

## 2023-10-23 RX ORDER — DEXAMETHASONE SODIUM PHOSPHATE 10 MG/ML
10 INJECTION, SOLUTION INTRAMUSCULAR; INTRAVENOUS ONCE
Status: COMPLETED | OUTPATIENT
Start: 2023-10-23 | End: 2023-10-23

## 2023-10-23 RX ORDER — PALONOSETRON 0.05 MG/ML
0.25 INJECTION, SOLUTION INTRAVENOUS ONCE
Status: CANCELLED | OUTPATIENT
Start: 2023-10-23 | End: 2023-10-23

## 2023-10-23 RX ORDER — ACETAMINOPHEN 325 MG/1
650 TABLET ORAL
Status: CANCELLED | OUTPATIENT
Start: 2023-10-23

## 2023-10-23 RX ORDER — DIPHENHYDRAMINE HYDROCHLORIDE 50 MG/ML
50 INJECTION INTRAMUSCULAR; INTRAVENOUS
Status: CANCELLED | OUTPATIENT
Start: 2023-10-23

## 2023-10-23 RX ORDER — ONDANSETRON 2 MG/ML
8 INJECTION INTRAMUSCULAR; INTRAVENOUS
Status: CANCELLED | OUTPATIENT
Start: 2023-10-23

## 2023-10-23 RX ORDER — FAMOTIDINE 10 MG/ML
20 INJECTION, SOLUTION INTRAVENOUS
Status: CANCELLED | OUTPATIENT
Start: 2023-10-23

## 2023-10-23 RX ORDER — PALONOSETRON 0.05 MG/ML
0.25 INJECTION, SOLUTION INTRAVENOUS ONCE
Status: COMPLETED | OUTPATIENT
Start: 2023-10-23 | End: 2023-10-23

## 2023-10-23 RX ORDER — SODIUM CHLORIDE 9 MG/ML
5-250 INJECTION, SOLUTION INTRAVENOUS PRN
Status: CANCELLED | OUTPATIENT
Start: 2023-10-23

## 2023-10-23 RX ORDER — SODIUM CHLORIDE 9 MG/ML
INJECTION, SOLUTION INTRAVENOUS CONTINUOUS
Status: CANCELLED | OUTPATIENT
Start: 2023-10-23

## 2023-10-23 RX ORDER — SODIUM CHLORIDE 0.9 % (FLUSH) 0.9 %
5-40 SYRINGE (ML) INJECTION PRN
Status: CANCELLED | OUTPATIENT
Start: 2023-10-23

## 2023-10-23 RX ORDER — ALBUTEROL SULFATE 90 UG/1
4 AEROSOL, METERED RESPIRATORY (INHALATION) PRN
Status: CANCELLED | OUTPATIENT
Start: 2023-10-23

## 2023-10-23 RX ORDER — MEPERIDINE HYDROCHLORIDE 50 MG/ML
12.5 INJECTION INTRAMUSCULAR; INTRAVENOUS; SUBCUTANEOUS PRN
Status: CANCELLED | OUTPATIENT
Start: 2023-10-23

## 2023-10-23 RX ORDER — EPINEPHRINE 1 MG/ML
0.3 INJECTION, SOLUTION, CONCENTRATE INTRAVENOUS PRN
Status: CANCELLED | OUTPATIENT
Start: 2023-10-23

## 2023-10-23 RX ORDER — SODIUM CHLORIDE 9 MG/ML
5-250 INJECTION, SOLUTION INTRAVENOUS PRN
Status: DISCONTINUED | OUTPATIENT
Start: 2023-10-23 | End: 2023-10-24 | Stop reason: HOSPADM

## 2023-10-23 RX ORDER — HEPARIN SODIUM (PORCINE) LOCK FLUSH IV SOLN 100 UNIT/ML 100 UNIT/ML
500 SOLUTION INTRAVENOUS PRN
Status: CANCELLED | OUTPATIENT
Start: 2023-10-23

## 2023-10-23 RX ADMIN — DEXAMETHASONE SODIUM PHOSPHATE 10 MG: 10 INJECTION INTRAMUSCULAR; INTRAVENOUS at 10:06

## 2023-10-23 RX ADMIN — SODIUM CHLORIDE 150 MG: 900 INJECTION, SOLUTION INTRAVENOUS at 10:05

## 2023-10-23 RX ADMIN — CARBOPLATIN 1050 MG: 600 INJECTION, SOLUTION INTRAVENOUS at 10:28

## 2023-10-23 RX ADMIN — SODIUM CHLORIDE 25 ML/HR: 9 INJECTION, SOLUTION INTRAVENOUS at 10:02

## 2023-10-23 RX ADMIN — PALONOSETRON 0.25 MG: 0.05 INJECTION, SOLUTION INTRAVENOUS at 10:06

## 2023-10-23 NOTE — PROGRESS NOTES
Lab Results   Component Value Date    WBC 4.94 10/05/2023    HGB 12.1 (L) 10/05/2023    HCT 34.6 (L) 10/05/2023    MCV 82.4 10/05/2023     10/05/2023     Lab Results   Component Value Date    NEUTROABS 1.97 10/05/2023     Lab Results   Component Value Date     09/24/2023    K 4.2 09/24/2023     09/24/2023    CO2 24 09/24/2023    BUN 6 09/24/2023    CREATININE 0.8 09/24/2023    GLUCOSE 104 09/24/2023    CALCIUM 9.8 09/24/2023    PROT 7.8 09/24/2023    LABALBU 4.8 09/24/2023    BILITOT 0.8 09/24/2023    ALKPHOS 60 09/24/2023    AST 24 09/24/2023    ALT 14 09/24/2023    LABGLOM >60 09/24/2023    GFRAA >59 01/04/2022

## 2023-10-30 ENCOUNTER — OFFICE VISIT (OUTPATIENT)
Dept: UROLOGY | Age: 28
End: 2023-10-30
Payer: MEDICAID

## 2023-10-30 VITALS — HEIGHT: 68 IN | BODY MASS INDEX: 23.04 KG/M2 | WEIGHT: 152 LBS | TEMPERATURE: 98.2 F

## 2023-10-30 DIAGNOSIS — C62.92 SEMINOMA OF LEFT TESTIS, STAGE 1 (HCC): Primary | ICD-10-CM

## 2023-10-30 LAB
APPEARANCE FLUID: CLEAR
BILIRUBIN, POC: NORMAL
BLOOD URINE, POC: NORMAL
CLARITY, POC: CLEAR
COLOR, POC: YELLOW
GLUCOSE URINE, POC: NORMAL
KETONES, POC: NORMAL
LEUKOCYTE EST, POC: NORMAL
NITRITE, POC: NORMAL
PH, POC: 7
PROTEIN, POC: NORMAL
SPECIFIC GRAVITY, POC: 1.01
UROBILINOGEN, POC: 0.2

## 2023-10-30 PROCEDURE — 81002 URINALYSIS NONAUTO W/O SCOPE: CPT | Performed by: UROLOGY

## 2023-10-30 PROCEDURE — 99214 OFFICE O/P EST MOD 30 MIN: CPT | Performed by: UROLOGY

## 2023-10-30 ASSESSMENT — ENCOUNTER SYMPTOMS
BACK PAIN: 0
VOMITING: 0
TROUBLE SWALLOWING: 0
DIARRHEA: 0
SHORTNESS OF BREATH: 0
ABDOMINAL PAIN: 0
COUGH: 0
EYE DISCHARGE: 0
NAUSEA: 0
ABDOMINAL DISTENTION: 0
EYE REDNESS: 0
CONSTIPATION: 0

## 2023-10-30 NOTE — PROGRESS NOTES
Urobilinogen, UA 0.2     Leukocytes, UA neg     Nitrite, UA neg     Appearance, Fluid Clear Clear, Slightly Cloudy     No results found for: \"PSA\"  No results found for: \"PSAFREEPCT\"          Component Ref Range & Units 8/7/23 0711 7/19/23 1540   Alpha Fetoprotein 0.0 - 8.3 ng/mL 3.1  3.1 CM                 Component Ref Range & Units 8/7/23 0711 7/19/23 1540   LD 91 - 215 U/L 126  370 High     Resulting Agency  [address] [facility]              Specimen Collected: 08/07/23 07:11 CDT Last Resulted: 08/07/23 07:51 CDT                 Component Ref Range & Units 8/7/23 0711 7/19/23 1540   hCG Tumor Marker 0 - 3 IU/L <1  34 High  CM         IMAGING:  CT scan of the chest is reviewed from 10/20/2023. This shows no evidence of mediastinal adenopathy or pulmonary metastasis    Abdominal CT scan with and without contrast done on 10/20/2023 shows no retroperitoneal adenopathy or evidence of metastasis. He has nonobstructing stone lower pole left kidney. Scrotal ultrasound done on 10/19/2023 shows normal right kidney. Left testicle surgically absent there is no abnormality in on the left scrotum in the area of palpable abnormality. 1. Seminoma of left testis, stage 1 ([address])  He has seen Dr. Wade Kumar oncology and has been elected to receive 2 cycles of carboplatinum adjuvant chemotherapy. He is completed his first cycle he will follow-up with Dr. Elma Man for second cycle. I will go ahead and order CT scans for 3 months but he can continue follow-up with Dr. Elma Man for his routine surveillance. The previously felt nodule in the left scrotum has resolved not seen on ultrasound likely just postoperative changes. - CT ABDOMEN PELVIS W IV CONTRAST Additional Contrast? Oral; Future  - CT CHEST W CONTRAST; Future  - AFP Tumor Marker; Future  - HCG, Tumor Marker;  Future      Orders Placed This Encounter   Procedures    CT ABDOMEN PELVIS W IV CONTRAST Additional Contrast? Oral

## 2023-11-16 NOTE — PROGRESS NOTES
MEDICAL ONCOLOGY PROGRESS NOTE    Pt Name: Darline Sidhu  MRN: [id number]  YOB: 1995  Date of evaluation: 11/17/2023      HISTORY OF PRESENT ILLNESS:    Reason for MD visit-toxicity assessment/disease management  The patient has been diagnosed with seminoma of the left testicle, stage I BS. He is status post left radical orchiectomy on 7/20/2023. He is currently receiving adjuvant chemotherapy with carboplatin AUC 7. He received cycle #1 with complaints of significant fatigue. Denies any nausea vomit diarrhea. Denies any febrile illness. He is here today for cycle #2. Unfortunately, his CBC showed significant grade 3 neutropenia. He has no fever. He has no other localized symptoms of infection. He feels well today. Diagnosis  Seminoma, left testicle, July 2023  Stage IBS    Treatment Summary  7/20/23 Left radical orchiectomy by Dr Amirah Davenport/[facility]  10/23/23 Initiated chemotherapy with Carbo AUC 7 every 3 weeks x2 cycles      Cancer History  Darline Sidhu was first seen by me on 10/5/2023. The patient initially felt pain and swelling in the left testicle. Eventually he was seen by urology. The patient was referred by Dr. Talon Shen for a diagnosis of testicular cancer, seminoma of the left testicle. He is a status post orchiectomy. 7/11/23 CT abd/pelvis ([facility]): 7.5 x 6.5 cm complex mass in the left scrotum, suspicious for neoplasm. Recommend ultrasound for further evaluation. Moderate left hydrocele. Nonspecific nonobstructive bowel gas pattern as described. Nonobstructive left nephrolithiasis. 7/11/23 US scrotum/testicles ([facility]): 9.7 x 6 x 6.7 cm heterogeneous mass in the left scrotum, suspicious for malignancy. Moderate left hydrocele. 7/19/23 [facility] labs: BHCG 34, AFP 3.1,   7/20/23 Left radical orchiectomy by Dr Amirah Davenport/[facility]  7/20/23 Mass, left testicle, radical orchiectomy: Seminoma. Maximum tumor diameter is 9.5 cm.  The tumor is confined to the

## 2023-11-17 ENCOUNTER — OFFICE VISIT (OUTPATIENT)
Dept: HEMATOLOGY | Age: 28
End: 2023-11-17
Payer: MEDICAID

## 2023-11-17 ENCOUNTER — HOSPITAL ENCOUNTER (OUTPATIENT)
Dept: INFUSION THERAPY | Age: 28
Discharge: HOME | End: 2023-11-17
Payer: MEDICAID

## 2023-11-17 VITALS
RESPIRATION RATE: 16 BRPM | HEIGHT: 68 IN | TEMPERATURE: 98 F | HEART RATE: 62 BPM | SYSTOLIC BLOOD PRESSURE: 100 MMHG | WEIGHT: 147 LBS | BODY MASS INDEX: 22.28 KG/M2 | DIASTOLIC BLOOD PRESSURE: 68 MMHG | OXYGEN SATURATION: 96 %

## 2023-11-17 DIAGNOSIS — T45.1X5D ADVERSE EFFECT OF CHEMOTHERAPY, SUBSEQUENT ENCOUNTER: ICD-10-CM

## 2023-11-17 DIAGNOSIS — Z71.89 CARE PLAN DISCUSSED WITH PATIENT: ICD-10-CM

## 2023-11-17 DIAGNOSIS — T45.1X5A CHEMOTHERAPY INDUCED NEUTROPENIA (HCC): ICD-10-CM

## 2023-11-17 DIAGNOSIS — T45.1X5A CHEMOTHERAPY-INDUCED THROMBOCYTOPENIA: ICD-10-CM

## 2023-11-17 DIAGNOSIS — Z51.11 CHEMOTHERAPY MANAGEMENT, ENCOUNTER FOR: ICD-10-CM

## 2023-11-17 DIAGNOSIS — C62.92 SEMINOMA OF LEFT TESTIS, STAGE 1 (HCC): Primary | ICD-10-CM

## 2023-11-17 DIAGNOSIS — D64.81 ANTINEOPLASTIC CHEMOTHERAPY INDUCED ANEMIA: ICD-10-CM

## 2023-11-17 DIAGNOSIS — D69.59 CHEMOTHERAPY-INDUCED THROMBOCYTOPENIA: ICD-10-CM

## 2023-11-17 DIAGNOSIS — D70.1 CHEMOTHERAPY INDUCED NEUTROPENIA (HCC): ICD-10-CM

## 2023-11-17 DIAGNOSIS — C62.92 SEMINOMA OF LEFT TESTIS, STAGE 1 (HCC): ICD-10-CM

## 2023-11-17 DIAGNOSIS — T45.1X5A ANTINEOPLASTIC CHEMOTHERAPY INDUCED ANEMIA: ICD-10-CM

## 2023-11-17 LAB
ALBUMIN SERPL-MCNC: 4.3 G/DL (ref 3.5–5.2)
ALP SERPL-CCNC: 62 U/L (ref 40–130)
ALT SERPL-CCNC: 22 U/L (ref 21–72)
ANION GAP SERPL CALCULATED.3IONS-SCNC: 10 MMOL/L (ref 7–19)
AST SERPL-CCNC: 29 U/L (ref 17–59)
BASOPHILS # BLD: 0.02 K/UL (ref 0.01–0.08)
BASOPHILS NFR BLD: 0.7 % (ref 0.1–1.2)
BILIRUB SERPL-MCNC: 0.9 MG/DL (ref 0.2–1.3)
BUN SERPL-MCNC: 11 MG/DL (ref 9–20)
CALCIUM SERPL-MCNC: 9.5 MG/DL (ref 8.4–10.2)
CHLORIDE SERPL-SCNC: 101 MMOL/L (ref 98–111)
CO2 SERPL-SCNC: 29 MMOL/L (ref 22–29)
CREAT SERPL-MCNC: 0.8 MG/DL (ref 0.6–1.2)
EOSINOPHIL # BLD: 0.04 K/UL (ref 0.04–0.54)
EOSINOPHIL NFR BLD: 1.5 % (ref 0.7–7)
ERYTHROCYTE [DISTWIDTH] IN BLOOD BY AUTOMATED COUNT: 14.7 % (ref 11.6–14.4)
GLOBULIN: 3.2 G/DL
GLUCOSE SERPL-MCNC: 90 MG/DL (ref 74–106)
HCT VFR BLD AUTO: 33 % (ref 40.1–51)
HGB BLD-MCNC: 11.4 G/DL (ref 13.7–17.5)
LYMPHOCYTES # BLD: 1.49 K/UL (ref 1.18–3.74)
LYMPHOCYTES NFR BLD: 55.2 % (ref 19.3–53.1)
MCH RBC QN AUTO: 29.1 PG (ref 25.7–32.2)
MCHC RBC AUTO-ENTMCNC: 34.5 G/DL (ref 32.3–36.5)
MCV RBC AUTO: 84.2 FL (ref 79–92.2)
MONOCYTES # BLD: 0.3 K/UL (ref 0.24–0.82)
MONOCYTES NFR BLD: 11.1 % (ref 4.7–12.5)
NEUTROPHILS # BLD: 0.85 K/UL (ref 1.56–6.13)
NEUTS SEG NFR BLD: 31.5 % (ref 34–71.1)
PLATELET # BLD AUTO: 123 K/UL (ref 163–337)
PMV BLD AUTO: 9.8 FL (ref 7.4–10.4)
POTASSIUM SERPL-SCNC: 4.5 MMOL/L (ref 3.5–5.1)
PROT SERPL-MCNC: 7.5 G/DL (ref 6.3–8.2)
RBC # BLD AUTO: 3.92 M/UL (ref 4.63–6.08)
SODIUM SERPL-SCNC: 140 MMOL/L (ref 137–145)
WBC # BLD AUTO: 2.7 K/UL (ref 4.23–9.07)

## 2023-11-17 PROCEDURE — 99214 OFFICE O/P EST MOD 30 MIN: CPT | Performed by: INTERNAL MEDICINE

## 2023-11-17 PROCEDURE — 80053 COMPREHEN METABOLIC PANEL: CPT

## 2023-11-17 PROCEDURE — 36415 COLL VENOUS BLD VENIPUNCTURE: CPT

## 2023-11-17 PROCEDURE — 99212 OFFICE O/P EST SF 10 MIN: CPT

## 2023-11-17 PROCEDURE — 85025 COMPLETE CBC W/AUTO DIFF WBC: CPT

## 2023-12-01 ENCOUNTER — HOSPITAL ENCOUNTER (OUTPATIENT)
Dept: INFUSION THERAPY | Age: 28
Discharge: HOME | End: 2023-12-01
Payer: MEDICAID

## 2023-12-01 VITALS
DIASTOLIC BLOOD PRESSURE: 63 MMHG | WEIGHT: 149.2 LBS | TEMPERATURE: 97.7 F | OXYGEN SATURATION: 100 % | BODY MASS INDEX: 22.69 KG/M2 | SYSTOLIC BLOOD PRESSURE: 105 MMHG | RESPIRATION RATE: 18 BRPM | HEART RATE: 87 BPM

## 2023-12-01 DIAGNOSIS — G89.3 CANCER RELATED PAIN: Primary | ICD-10-CM

## 2023-12-01 DIAGNOSIS — C62.92 SEMINOMA OF LEFT TESTIS, STAGE 1 (HCC): ICD-10-CM

## 2023-12-01 DIAGNOSIS — C62.92 SEMINOMA OF LEFT TESTIS, STAGE 1 (HCC): Primary | ICD-10-CM

## 2023-12-01 LAB
ALBUMIN SERPL-MCNC: 4.6 G/DL (ref 3.5–5.2)
ALP SERPL-CCNC: 67 U/L (ref 40–130)
ALT SERPL-CCNC: 22 U/L (ref 21–72)
ANION GAP SERPL CALCULATED.3IONS-SCNC: 13 MMOL/L (ref 7–19)
AST SERPL-CCNC: 51 U/L (ref 17–59)
BASOPHILS # BLD: 0.03 K/UL (ref 0.01–0.08)
BASOPHILS NFR BLD: 0.8 % (ref 0.1–1.2)
BILIRUB SERPL-MCNC: 0.6 MG/DL (ref 0.2–1.3)
BUN SERPL-MCNC: 13 MG/DL (ref 9–20)
CALCIUM SERPL-MCNC: 9 MG/DL (ref 8.4–10.2)
CHLORIDE SERPL-SCNC: 102 MMOL/L (ref 98–111)
CO2 SERPL-SCNC: 27 MMOL/L (ref 22–29)
CREAT SERPL-MCNC: 0.8 MG/DL (ref 0.6–1.2)
EOSINOPHIL # BLD: 0.05 K/UL (ref 0.04–0.54)
EOSINOPHIL NFR BLD: 1.3 % (ref 0.7–7)
ERYTHROCYTE [DISTWIDTH] IN BLOOD BY AUTOMATED COUNT: 15.4 % (ref 11.6–14.4)
GLOBULIN: 3.2 G/DL
GLUCOSE SERPL-MCNC: 92 MG/DL (ref 74–106)
HCT VFR BLD AUTO: 32.7 % (ref 40.1–51)
HGB BLD-MCNC: 11.2 G/DL (ref 13.7–17.5)
LYMPHOCYTES # BLD: 1.72 K/UL (ref 1.18–3.74)
LYMPHOCYTES NFR BLD: 43 % (ref 19.3–53.1)
MCH RBC QN AUTO: 29.2 PG (ref 25.7–32.2)
MCHC RBC AUTO-ENTMCNC: 34.3 G/DL (ref 32.3–36.5)
MCV RBC AUTO: 85.2 FL (ref 79–92.2)
MONOCYTES # BLD: 0.4 K/UL (ref 0.24–0.82)
MONOCYTES NFR BLD: 10 % (ref 4.7–12.5)
NEUTROPHILS # BLD: 1.79 K/UL (ref 1.56–6.13)
NEUTS SEG NFR BLD: 44.6 % (ref 34–71.1)
PLATELET # BLD AUTO: 267 K/UL (ref 163–337)
PMV BLD AUTO: 9 FL (ref 7.4–10.4)
POTASSIUM SERPL-SCNC: 4.8 MMOL/L (ref 3.5–5.1)
PROT SERPL-MCNC: 7.8 G/DL (ref 6.3–8.2)
RBC # BLD AUTO: 3.84 M/UL (ref 4.63–6.08)
SODIUM SERPL-SCNC: 142 MMOL/L (ref 137–145)
WBC # BLD AUTO: 4 K/UL (ref 4.23–9.07)

## 2023-12-01 PROCEDURE — 96375 TX/PRO/DX INJ NEW DRUG ADDON: CPT

## 2023-12-01 PROCEDURE — 80053 COMPREHEN METABOLIC PANEL: CPT

## 2023-12-01 PROCEDURE — 96367 TX/PROPH/DG ADDL SEQ IV INF: CPT

## 2023-12-01 PROCEDURE — 96413 CHEMO IV INFUSION 1 HR: CPT

## 2023-12-01 PROCEDURE — 2580000003 HC RX 258: Performed by: INTERNAL MEDICINE

## 2023-12-01 PROCEDURE — 36415 COLL VENOUS BLD VENIPUNCTURE: CPT

## 2023-12-01 PROCEDURE — 85025 COMPLETE CBC W/AUTO DIFF WBC: CPT

## 2023-12-01 PROCEDURE — 6360000002 HC RX W HCPCS: Performed by: INTERNAL MEDICINE

## 2023-12-01 RX ORDER — SODIUM CHLORIDE 9 MG/ML
5-250 INJECTION, SOLUTION INTRAVENOUS PRN
Status: CANCELLED | OUTPATIENT
Start: 2023-12-01

## 2023-12-01 RX ORDER — PALONOSETRON 0.05 MG/ML
0.25 INJECTION, SOLUTION INTRAVENOUS ONCE
Status: CANCELLED | OUTPATIENT
Start: 2023-12-01 | End: 2023-12-01

## 2023-12-01 RX ORDER — SODIUM CHLORIDE 0.9 % (FLUSH) 0.9 %
5-40 SYRINGE (ML) INJECTION PRN
Status: DISCONTINUED | OUTPATIENT
Start: 2023-12-01 | End: 2023-12-02 | Stop reason: HOSPADM

## 2023-12-01 RX ORDER — MEPERIDINE HYDROCHLORIDE 50 MG/ML
12.5 INJECTION INTRAMUSCULAR; INTRAVENOUS; SUBCUTANEOUS PRN
Status: CANCELLED | OUTPATIENT
Start: 2023-12-01

## 2023-12-01 RX ORDER — PALONOSETRON 0.05 MG/ML
0.25 INJECTION, SOLUTION INTRAVENOUS ONCE
Status: COMPLETED | OUTPATIENT
Start: 2023-12-01 | End: 2023-12-01

## 2023-12-01 RX ORDER — EPINEPHRINE 1 MG/ML
0.3 INJECTION, SOLUTION, CONCENTRATE INTRAVENOUS PRN
Status: CANCELLED | OUTPATIENT
Start: 2023-12-01

## 2023-12-01 RX ORDER — ALBUTEROL SULFATE 90 UG/1
4 AEROSOL, METERED RESPIRATORY (INHALATION) PRN
Status: CANCELLED | OUTPATIENT
Start: 2023-12-01

## 2023-12-01 RX ORDER — ONDANSETRON 4 MG/1
8 TABLET, FILM COATED ORAL EVERY 8 HOURS PRN
Qty: 30 TABLET | Refills: 2 | Status: SHIPPED | OUTPATIENT
Start: 2023-12-01

## 2023-12-01 RX ORDER — FAMOTIDINE 10 MG/ML
20 INJECTION, SOLUTION INTRAVENOUS
Status: CANCELLED | OUTPATIENT
Start: 2023-12-01

## 2023-12-01 RX ORDER — HEPARIN 100 UNIT/ML
500 SYRINGE INTRAVENOUS PRN
Status: DISCONTINUED | OUTPATIENT
Start: 2023-12-01 | End: 2023-12-02 | Stop reason: HOSPADM

## 2023-12-01 RX ORDER — NAPROXEN 250 MG/1
250 TABLET ORAL 2 TIMES DAILY WITH MEALS
Qty: 60 TABLET | Refills: 0 | Status: SHIPPED | OUTPATIENT
Start: 2023-12-01

## 2023-12-01 RX ORDER — ONDANSETRON 2 MG/ML
8 INJECTION INTRAMUSCULAR; INTRAVENOUS
Status: CANCELLED | OUTPATIENT
Start: 2023-12-01

## 2023-12-01 RX ORDER — SODIUM CHLORIDE 0.9 % (FLUSH) 0.9 %
5-40 SYRINGE (ML) INJECTION PRN
Status: CANCELLED | OUTPATIENT
Start: 2023-12-01

## 2023-12-01 RX ORDER — DEXAMETHASONE SODIUM PHOSPHATE 10 MG/ML
10 INJECTION, SOLUTION INTRAMUSCULAR; INTRAVENOUS ONCE
Status: COMPLETED | OUTPATIENT
Start: 2023-12-01 | End: 2023-12-01

## 2023-12-01 RX ORDER — DIPHENHYDRAMINE HYDROCHLORIDE 50 MG/ML
50 INJECTION INTRAMUSCULAR; INTRAVENOUS
Status: CANCELLED | OUTPATIENT
Start: 2023-12-01

## 2023-12-01 RX ORDER — ACETAMINOPHEN 325 MG/1
650 TABLET ORAL
Status: CANCELLED | OUTPATIENT
Start: 2023-12-01

## 2023-12-01 RX ORDER — HEPARIN SODIUM (PORCINE) LOCK FLUSH IV SOLN 100 UNIT/ML 100 UNIT/ML
500 SOLUTION INTRAVENOUS PRN
Status: CANCELLED | OUTPATIENT
Start: 2023-12-01

## 2023-12-01 RX ORDER — SODIUM CHLORIDE 9 MG/ML
INJECTION, SOLUTION INTRAVENOUS CONTINUOUS
Status: CANCELLED | OUTPATIENT
Start: 2023-12-01

## 2023-12-01 RX ADMIN — FOSAPREPITANT DIMEGLUMINE 150 MG: 150 INJECTION, POWDER, LYOPHILIZED, FOR SOLUTION INTRAVENOUS at 15:26

## 2023-12-01 RX ADMIN — PALONOSETRON 0.25 MG: 0.05 INJECTION, SOLUTION INTRAVENOUS at 15:15

## 2023-12-01 RX ADMIN — CARBOPLATIN 1050 MG: 10 INJECTION INTRAVENOUS at 15:52

## 2023-12-01 RX ADMIN — DEXAMETHASONE SODIUM PHOSPHATE 10 MG: 10 INJECTION, SOLUTION INTRAMUSCULAR; INTRAVENOUS at 15:18

## 2023-12-21 ENCOUNTER — HOSPITAL ENCOUNTER (OUTPATIENT)
Dept: INFUSION THERAPY | Age: 28
Discharge: HOME | End: 2023-12-21
Payer: MEDICAID

## 2023-12-21 DIAGNOSIS — C62.92 SEMINOMA OF LEFT TESTIS, STAGE 1 (HCC): ICD-10-CM

## 2023-12-21 LAB
ALBUMIN SERPL-MCNC: 4.6 G/DL (ref 3.5–5.2)
ALP SERPL-CCNC: 60 U/L (ref 40–130)
ALT SERPL-CCNC: 22 U/L (ref 21–72)
ANION GAP SERPL CALCULATED.3IONS-SCNC: 7 MMOL/L (ref 7–19)
AST SERPL-CCNC: 48 U/L (ref 17–59)
BASOPHILS # BLD: 0.01 K/UL (ref 0.01–0.08)
BASOPHILS NFR BLD: 0.4 % (ref 0.1–1.2)
BILIRUB SERPL-MCNC: 0.6 MG/DL (ref 0.2–1.3)
BUN SERPL-MCNC: 12 MG/DL (ref 9–20)
CALCIUM SERPL-MCNC: 9.4 MG/DL (ref 8.4–10.2)
CHLORIDE SERPL-SCNC: 106 MMOL/L (ref 98–111)
CO2 SERPL-SCNC: 29 MMOL/L (ref 22–29)
CREAT SERPL-MCNC: 0.8 MG/DL (ref 0.6–1.2)
EOSINOPHIL # BLD: 0.03 K/UL (ref 0.04–0.54)
EOSINOPHIL NFR BLD: 1.1 % (ref 0.7–7)
ERYTHROCYTE [DISTWIDTH] IN BLOOD BY AUTOMATED COUNT: 14.9 % (ref 11.6–14.4)
GLOBULIN: 3 G/DL
GLUCOSE SERPL-MCNC: 108 MG/DL (ref 74–106)
HCT VFR BLD AUTO: 30.2 % (ref 40.1–51)
HGB BLD-MCNC: 10.3 G/DL (ref 13.7–17.5)
LYMPHOCYTES # BLD: 1.33 K/UL (ref 1.18–3.74)
LYMPHOCYTES NFR BLD: 49.1 % (ref 19.3–53.1)
MCH RBC QN AUTO: 29.4 PG (ref 25.7–32.2)
MCHC RBC AUTO-ENTMCNC: 34.1 G/DL (ref 32.3–36.5)
MCV RBC AUTO: 86.3 FL (ref 79–92.2)
MONOCYTES # BLD: 0.27 K/UL (ref 0.24–0.82)
MONOCYTES NFR BLD: 10 % (ref 4.7–12.5)
NEUTROPHILS # BLD: 1.07 K/UL (ref 1.56–6.13)
NEUTS SEG NFR BLD: 39.4 % (ref 34–71.1)
PLATELET # BLD AUTO: 80 K/UL (ref 163–337)
PMV BLD AUTO: 9.3 FL (ref 7.4–10.4)
POTASSIUM SERPL-SCNC: 4.7 MMOL/L (ref 3.5–5.1)
PROT SERPL-MCNC: 7.7 G/DL (ref 6.3–8.2)
RBC # BLD AUTO: 3.5 M/UL (ref 4.63–6.08)
SODIUM SERPL-SCNC: 142 MMOL/L (ref 137–145)
WBC # BLD AUTO: 2.71 K/UL (ref 4.23–9.07)

## 2023-12-21 PROCEDURE — 99212 OFFICE O/P EST SF 10 MIN: CPT

## 2023-12-21 PROCEDURE — 85025 COMPLETE CBC W/AUTO DIFF WBC: CPT

## 2023-12-21 PROCEDURE — 80053 COMPREHEN METABOLIC PANEL: CPT

## 2023-12-21 PROCEDURE — 36415 COLL VENOUS BLD VENIPUNCTURE: CPT

## 2023-12-27 ENCOUNTER — HOSPITAL ENCOUNTER (OUTPATIENT)
Dept: INFUSION THERAPY | Age: 28
Discharge: HOME | End: 2023-12-27
Payer: MEDICAID

## 2023-12-27 DIAGNOSIS — T45.1X5A CHEMOTHERAPY INDUCED NEUTROPENIA (HCC): ICD-10-CM

## 2023-12-27 DIAGNOSIS — D70.1 CHEMOTHERAPY INDUCED NEUTROPENIA (HCC): ICD-10-CM

## 2023-12-27 DIAGNOSIS — C62.92 SEMINOMA OF LEFT TESTIS, STAGE 1 (HCC): ICD-10-CM

## 2023-12-27 LAB
BASOPHILS # BLD: 0.01 K/UL (ref 0.01–0.08)
BASOPHILS NFR BLD: 0.3 % (ref 0.1–1.2)
EOSINOPHIL # BLD: 0.07 K/UL (ref 0.04–0.54)
EOSINOPHIL NFR BLD: 2.1 % (ref 0.7–7)
ERYTHROCYTE [DISTWIDTH] IN BLOOD BY AUTOMATED COUNT: 15.2 % (ref 11.6–14.4)
HCT VFR BLD AUTO: 29 % (ref 40.1–51)
HGB BLD-MCNC: 9.9 G/DL (ref 13.7–17.5)
LYMPHOCYTES # BLD: 1.65 K/UL (ref 1.18–3.74)
LYMPHOCYTES NFR BLD: 49.8 % (ref 19.3–53.1)
MCH RBC QN AUTO: 29.5 PG (ref 25.7–32.2)
MCHC RBC AUTO-ENTMCNC: 34.1 G/DL (ref 32.3–36.5)
MCV RBC AUTO: 86.3 FL (ref 79–92.2)
MONOCYTES # BLD: 0.25 K/UL (ref 0.24–0.82)
MONOCYTES NFR BLD: 7.6 % (ref 4.7–12.5)
NEUTROPHILS # BLD: 1.33 K/UL (ref 1.56–6.13)
NEUTS SEG NFR BLD: 40.2 % (ref 34–71.1)
PLATELET # BLD AUTO: 107 K/UL (ref 163–337)
PMV BLD AUTO: 9.1 FL (ref 7.4–10.4)
RBC # BLD AUTO: 3.36 M/UL (ref 4.63–6.08)
WBC # BLD AUTO: 3.31 K/UL (ref 4.23–9.07)

## 2023-12-27 PROCEDURE — 85025 COMPLETE CBC W/AUTO DIFF WBC: CPT

## 2023-12-27 PROCEDURE — 36415 COLL VENOUS BLD VENIPUNCTURE: CPT

## 2024-01-03 ENCOUNTER — HOSPITAL ENCOUNTER (OUTPATIENT)
Dept: INFUSION THERAPY | Age: 29
Discharge: HOME | End: 2024-01-03
Payer: MEDICAID

## 2024-01-03 DIAGNOSIS — D70.1 CHEMOTHERAPY INDUCED NEUTROPENIA (HCC): ICD-10-CM

## 2024-01-03 DIAGNOSIS — T45.1X5A CHEMOTHERAPY INDUCED NEUTROPENIA (HCC): ICD-10-CM

## 2024-01-03 DIAGNOSIS — C62.92 SEMINOMA OF LEFT TESTIS, STAGE 1 (HCC): ICD-10-CM

## 2024-01-03 LAB
BASOPHILS # BLD: 0.01 K/UL (ref 0.01–0.08)
BASOPHILS NFR BLD: 0.2 % (ref 0.1–1.2)
EOSINOPHIL # BLD: 0.07 K/UL (ref 0.04–0.54)
EOSINOPHIL NFR BLD: 1.6 % (ref 0.7–7)
ERYTHROCYTE [DISTWIDTH] IN BLOOD BY AUTOMATED COUNT: 16.3 % (ref 11.6–14.4)
HCT VFR BLD AUTO: 30.5 % (ref 40.1–51)
HGB BLD-MCNC: 10.3 G/DL (ref 13.7–17.5)
LYMPHOCYTES # BLD: 2.04 K/UL (ref 1.18–3.74)
LYMPHOCYTES NFR BLD: 47 % (ref 19.3–53.1)
MCH RBC QN AUTO: 29.9 PG (ref 25.7–32.2)
MCHC RBC AUTO-ENTMCNC: 33.8 G/DL (ref 32.3–36.5)
MCV RBC AUTO: 88.7 FL (ref 79–92.2)
MONOCYTES # BLD: 0.41 K/UL (ref 0.24–0.82)
MONOCYTES NFR BLD: 9.4 % (ref 4.7–12.5)
NEUTROPHILS # BLD: 1.8 K/UL (ref 1.56–6.13)
NEUTS SEG NFR BLD: 41.6 % (ref 34–71.1)
PLATELET # BLD AUTO: 271 K/UL (ref 163–337)
PMV BLD AUTO: 8.5 FL (ref 7.4–10.4)
RBC # BLD AUTO: 3.44 M/UL (ref 4.63–6.08)
WBC # BLD AUTO: 4.34 K/UL (ref 4.23–9.07)

## 2024-01-03 PROCEDURE — 36415 COLL VENOUS BLD VENIPUNCTURE: CPT

## 2024-01-03 PROCEDURE — 85025 COMPLETE CBC W/AUTO DIFF WBC: CPT

## 2024-01-10 ENCOUNTER — HOSPITAL ENCOUNTER (OUTPATIENT)
Dept: INFUSION THERAPY | Age: 29
Discharge: HOME | End: 2024-01-10
Payer: MEDICAID

## 2024-01-10 DIAGNOSIS — D70.1 CHEMOTHERAPY INDUCED NEUTROPENIA (HCC): ICD-10-CM

## 2024-01-10 DIAGNOSIS — T45.1X5A CHEMOTHERAPY INDUCED NEUTROPENIA (HCC): ICD-10-CM

## 2024-01-10 DIAGNOSIS — C62.92 SEMINOMA OF LEFT TESTIS, STAGE 1 (HCC): ICD-10-CM

## 2024-01-10 LAB
BASOPHILS # BLD: 0.03 K/UL (ref 0.01–0.08)
BASOPHILS NFR BLD: 0.6 % (ref 0.1–1.2)
EOSINOPHIL # BLD: 0.09 K/UL (ref 0.04–0.54)
EOSINOPHIL NFR BLD: 1.9 % (ref 0.7–7)
ERYTHROCYTE [DISTWIDTH] IN BLOOD BY AUTOMATED COUNT: 16 % (ref 11.6–14.4)
HCT VFR BLD AUTO: 33.4 % (ref 40.1–51)
HGB BLD-MCNC: 11.3 G/DL (ref 13.7–17.5)
LYMPHOCYTES # BLD: 2.24 K/UL (ref 1.18–3.74)
LYMPHOCYTES NFR BLD: 48.1 % (ref 19.3–53.1)
MCH RBC QN AUTO: 29.9 PG (ref 25.7–32.2)
MCHC RBC AUTO-ENTMCNC: 33.8 G/DL (ref 32.3–36.5)
MCV RBC AUTO: 88.4 FL (ref 79–92.2)
MONOCYTES # BLD: 0.72 K/UL (ref 0.24–0.82)
MONOCYTES NFR BLD: 15.5 % (ref 4.7–12.5)
NEUTROPHILS # BLD: 1.57 K/UL (ref 1.56–6.13)
NEUTS SEG NFR BLD: 33.7 % (ref 34–71.1)
PLATELET # BLD AUTO: 304 K/UL (ref 163–337)
PMV BLD AUTO: 8.8 FL (ref 7.4–10.4)
RBC # BLD AUTO: 3.78 M/UL (ref 4.63–6.08)
WBC # BLD AUTO: 4.66 K/UL (ref 4.23–9.07)

## 2024-01-10 PROCEDURE — 36415 COLL VENOUS BLD VENIPUNCTURE: CPT

## 2024-01-10 PROCEDURE — 85025 COMPLETE CBC W/AUTO DIFF WBC: CPT

## 2024-02-05 ENCOUNTER — HOSPITAL ENCOUNTER (OUTPATIENT)
Dept: CT IMAGING | Age: 29
Discharge: HOME | End: 2024-02-05
Payer: MEDICAID

## 2024-02-05 DIAGNOSIS — C62.92 SEMINOMA OF LEFT TESTIS, STAGE 1 (HCC): ICD-10-CM

## 2024-02-05 PROCEDURE — 6360000004 HC RX CONTRAST MEDICATION: Performed by: UROLOGY

## 2024-02-05 PROCEDURE — 74177 CT ABD & PELVIS W/CONTRAST: CPT

## 2024-02-05 PROCEDURE — 71260 CT THORAX DX C+: CPT

## 2024-02-05 RX ADMIN — IOPAMIDOL 75 ML: 755 INJECTION, SOLUTION INTRAVENOUS at 11:50

## 2024-02-19 ENCOUNTER — OFFICE VISIT (OUTPATIENT)
Dept: UROLOGY | Age: 29
End: 2024-02-19
Payer: MEDICAID

## 2024-02-19 VITALS — TEMPERATURE: 98.2 F | BODY MASS INDEX: 23.73 KG/M2 | WEIGHT: 151.2 LBS | HEIGHT: 67 IN

## 2024-02-19 DIAGNOSIS — C62.92 SEMINOMA OF LEFT TESTIS, STAGE 1 (HCC): ICD-10-CM

## 2024-02-19 DIAGNOSIS — C62.92 SEMINOMA OF LEFT TESTIS, STAGE 1 (HCC): Primary | ICD-10-CM

## 2024-02-19 LAB
AFP SERPL-MCNC: 3.1 NG/ML (ref 0–8.3)
APPEARANCE FLUID: CLEAR
BILIRUBIN, POC: NORMAL
BLOOD URINE, POC: NORMAL
CLARITY, POC: CLEAR
COLOR, POC: YELLOW
GLUCOSE URINE, POC: NORMAL
KETONES, POC: NORMAL
LDH SERPL-CCNC: 164 U/L (ref 91–215)
LEUKOCYTE EST, POC: NORMAL
NITRITE, POC: NORMAL
PH, POC: 7
PROTEIN, POC: NORMAL
SPECIFIC GRAVITY, POC: 1.02
UROBILINOGEN, POC: 1

## 2024-02-19 PROCEDURE — 81002 URINALYSIS NONAUTO W/O SCOPE: CPT | Performed by: UROLOGY

## 2024-02-19 PROCEDURE — 99214 OFFICE O/P EST MOD 30 MIN: CPT | Performed by: UROLOGY

## 2024-02-19 ASSESSMENT — ENCOUNTER SYMPTOMS
ABDOMINAL PAIN: 0
TROUBLE SWALLOWING: 0
DIARRHEA: 0
CONSTIPATION: 0
EYE REDNESS: 0
ABDOMINAL DISTENTION: 0
VOMITING: 0
BACK PAIN: 0
NAUSEA: 0
EYE DISCHARGE: 0
SHORTNESS OF BREATH: 0
COUGH: 0

## 2024-02-19 NOTE — PROGRESS NOTES
Steve Urena is a 29 y.o. male who presents today   Chief Complaint   Patient presents with    Follow-up     I am here today for my 3 month follow up. My scans are done but my labs were done today.      Stage I seminoma left testicular cancer  Patient is status post left orchiectomy done on 7/20/2023 for left testicular cancer that showed preorchiectomy tumor markers showed seminoma.  CTs showed no metastasis.  Stage I seminoma.  Mildly elevated LDH and slightly elevated hCG.  Postorchiectomy tumor markers normalized.  He did undergo 2 cycles of adjuvant chemotherapy by Dr. Corral completed November 2023.  He now comes in for follow-up CT scan chest abdomen pelvis, physical examination, and tumor markers.  Patient had some fatigue during the chemotherapy but this is resolved he says he feels well today.      Past Medical History:   Diagnosis Date    Cancer (HCC)     Kidney stone        Past Surgical History:   Procedure Laterality Date    DENTAL SURGERY      TESTICLE REMOVAL Left 7/20/2023    LEFT RADICAL ORCHIECTOMY performed by Selvin Davenport MD at [facility] OR       Current Outpatient Medications   Medication Sig Dispense Refill    naproxen (NAPROSYN) 250 MG tablet Take 1 tablet by mouth 2 times daily (with meals) (Patient taking differently: Take 1 tablet by mouth as needed) 60 tablet 0    promethazine (PHENERGAN) 25 MG tablet Take 1 tablet by mouth every 6 hours as needed for Nausea 30 tablet 1    ondansetron (ZOFRAN) 4 MG tablet Take 2 tablets by mouth every 8 hours as needed for Nausea or Vomiting (Patient not taking: Reported on 12/21/2023) 30 tablet 2    docusate sodium (COLACE) 100 MG capsule Take 1 capsule by mouth 2 times daily (Patient not taking: Reported on 10/30/2023) 20 capsule 0     No current facility-administered medications for this visit.       No Known Allergies    Social History     Socioeconomic History    Marital status: Single   Tobacco Use    Smoking status: Every Day     Current

## 2024-02-22 LAB — B-HCG SERPL-ACNC: <1 IU/L (ref 0–3)

## 2024-04-08 ENCOUNTER — TELEPHONE (OUTPATIENT)
Dept: HEMATOLOGY | Age: 29
End: 2024-04-08

## 2024-04-08 NOTE — PROGRESS NOTES
ultrasound.  10/23/2023-initiation of cycle #1/2 carboplatin AUC 7  11/17/2023-cycle #2/2 carboplatin AUC 7 was held due to ANC 0.85  10/23/23-12/1/23 Completed chemotherapy with Carbo AUC 7 every 3 weeks x2 cycles  2/5/24 CT Chest W Contrast: No acute cardiopulmonary process. No evidence of metastatic disease within the chest.  2/5/24 CT Abd/Pelvis W IV Contrast: No acute cardiopulmonary process. No evidence of metastatic disease within the chest.       Suggested NCCN guidelines surveillance follow-up            PLAN:  RTC with MD in 6 months  CBC, CMP, HCG, AFP today for toxicity assessment  Continue Zofran and Phenergan as needed  Continue follow-up with Dr Selvin Davenport/[facility], 8/19/24  Proceed with repeat  CT Chest Abdomen,Pelvis in Feb 2025  He declined referral to behavioral health at this time    Follow Up:   Return in about 6 months (around 11/14/2024) for CBC, CMP, HCG, AFPAppointment with Dr. Corral.  Data Unavailable    I, Tory Gunter am pre-charting as a registered nurse for Juliana Corral MD. Electronically signed by Tory Gunter RN on 5/14/2024 at 3:34 PM CDT.  I have seen, examined and reviewed this patient medication list, appropriate labs and imaging studies. I reviewed relevant medical records and others physician’s notes. I discussed the plans of care with the patient. I answered all the questions to the patient’s satisfaction. I have also reviewed the chief complaint (CC) and part of the history (History of Present Illness (HPI), Past Family Social History (PFSH), or Review of Systems (ROS) and made changes when appropriated.       (Please note that portions of this note were completed with a voice recognition program. Efforts were made to edit the dictations but occasionally words are mis-transcribed.)Electronically signed by Juliana Corral MD on 5/14/2024 at 10:12 AM

## 2024-04-08 NOTE — TELEPHONE ENCOUNTER
Called pt. to remind them of appointment on 04/10/2024 and had to leave a detailed voicemail with appointment date and time.

## 2024-05-10 ENCOUNTER — TELEPHONE (OUTPATIENT)
Dept: HEMATOLOGY | Age: 29
End: 2024-05-10

## 2024-05-10 NOTE — TELEPHONE ENCOUNTER
Called pt. to remind them of appointment on 05/14/2024 and had to leave a detailed voicemail with appointment date and time.

## 2024-05-14 ENCOUNTER — OFFICE VISIT (OUTPATIENT)
Dept: HEMATOLOGY | Age: 29
End: 2024-05-14
Payer: MEDICAID

## 2024-05-14 ENCOUNTER — HOSPITAL ENCOUNTER (OUTPATIENT)
Dept: INFUSION THERAPY | Age: 29
Discharge: HOME | End: 2024-05-14
Payer: MEDICAID

## 2024-05-14 VITALS
OXYGEN SATURATION: 98 % | DIASTOLIC BLOOD PRESSURE: 80 MMHG | TEMPERATURE: 97.9 F | WEIGHT: 156 LBS | HEIGHT: 67 IN | BODY MASS INDEX: 24.48 KG/M2 | HEART RATE: 68 BPM | SYSTOLIC BLOOD PRESSURE: 112 MMHG

## 2024-05-14 DIAGNOSIS — R53.83 OTHER FATIGUE: ICD-10-CM

## 2024-05-14 DIAGNOSIS — C62.92 SEMINOMA OF LEFT TESTIS, STAGE 1 (HCC): ICD-10-CM

## 2024-05-14 DIAGNOSIS — Z71.89 CARE PLAN DISCUSSED WITH PATIENT: ICD-10-CM

## 2024-05-14 DIAGNOSIS — F41.9 ANXIETY IN CANCER PATIENT: ICD-10-CM

## 2024-05-14 DIAGNOSIS — C62.92 SEMINOMA OF LEFT TESTIS, STAGE 1 (HCC): Primary | ICD-10-CM

## 2024-05-14 DIAGNOSIS — R11.0 NAUSEA: ICD-10-CM

## 2024-05-14 LAB
AFP SERPL-MCNC: 3.2 NG/ML (ref 0–8.3)
ALBUMIN SERPL-MCNC: 5 G/DL (ref 3.5–5.2)
ALP SERPL-CCNC: 67 U/L (ref 40–130)
ALT SERPL-CCNC: 22 U/L (ref 21–72)
ANION GAP SERPL CALCULATED.3IONS-SCNC: 12 MMOL/L (ref 7–19)
AST SERPL-CCNC: 36 U/L (ref 17–59)
BASOPHILS # BLD: 0.02 K/UL (ref 0.01–0.08)
BASOPHILS NFR BLD: 0.4 % (ref 0.1–1.2)
BILIRUB SERPL-MCNC: 0.9 MG/DL (ref 0.2–1.3)
BUN SERPL-MCNC: 11 MG/DL (ref 9–20)
CALCIUM SERPL-MCNC: 9.4 MG/DL (ref 8.4–10.2)
CHLORIDE SERPL-SCNC: 103 MMOL/L (ref 98–111)
CO2 SERPL-SCNC: 24 MMOL/L (ref 22–29)
CREAT SERPL-MCNC: 0.9 MG/DL (ref 0.6–1.2)
EOSINOPHIL # BLD: 0.16 K/UL (ref 0.04–0.54)
EOSINOPHIL NFR BLD: 3.6 % (ref 0.7–7)
ERYTHROCYTE [DISTWIDTH] IN BLOOD BY AUTOMATED COUNT: 12.7 % (ref 11.6–14.4)
GLOBULIN: 2.7 G/DL
GLUCOSE SERPL-MCNC: 97 MG/DL (ref 74–106)
HCT VFR BLD AUTO: 39.5 % (ref 40.1–51)
HGB BLD-MCNC: 14.2 G/DL (ref 13.7–17.5)
LYMPHOCYTES # BLD: 1.71 K/UL (ref 1.18–3.74)
LYMPHOCYTES NFR BLD: 38.2 % (ref 19.3–53.1)
MCH RBC QN AUTO: 29.8 PG (ref 25.7–32.2)
MCHC RBC AUTO-ENTMCNC: 35.9 G/DL (ref 32.3–36.5)
MCV RBC AUTO: 82.8 FL (ref 79–92.2)
MONOCYTES # BLD: 0.48 K/UL (ref 0.24–0.82)
MONOCYTES NFR BLD: 10.7 % (ref 4.7–12.5)
NEUTROPHILS # BLD: 2.11 K/UL (ref 1.56–6.13)
NEUTS SEG NFR BLD: 47.1 % (ref 34–71.1)
PLATELET # BLD AUTO: 233 K/UL (ref 163–337)
PMV BLD AUTO: 9.2 FL (ref 7.4–10.4)
POTASSIUM SERPL-SCNC: 4.5 MMOL/L (ref 3.5–5.1)
PROT SERPL-MCNC: 7.7 G/DL (ref 6.3–8.2)
RBC # BLD AUTO: 4.77 M/UL (ref 4.63–6.08)
SODIUM SERPL-SCNC: 139 MMOL/L (ref 137–145)
WBC # BLD AUTO: 4.48 K/UL (ref 4.23–9.07)

## 2024-05-14 PROCEDURE — 80053 COMPREHEN METABOLIC PANEL: CPT

## 2024-05-14 PROCEDURE — 36415 COLL VENOUS BLD VENIPUNCTURE: CPT

## 2024-05-14 PROCEDURE — 99213 OFFICE O/P EST LOW 20 MIN: CPT | Performed by: INTERNAL MEDICINE

## 2024-05-14 PROCEDURE — 85025 COMPLETE CBC W/AUTO DIFF WBC: CPT

## 2024-05-14 PROCEDURE — G2211 COMPLEX E/M VISIT ADD ON: HCPCS | Performed by: INTERNAL MEDICINE

## 2024-05-14 PROCEDURE — 99212 OFFICE O/P EST SF 10 MIN: CPT

## 2024-05-16 LAB — B-HCG SERPL-ACNC: <1 IU/L (ref 0–3)

## 2024-10-08 ENCOUNTER — TELEPHONE (OUTPATIENT)
Dept: HEMATOLOGY | Age: 29
End: 2024-10-08

## 2024-10-08 NOTE — TELEPHONE ENCOUNTER
Spoke with Kelin Dyer who stated, \"I will have Steve call to reschedule his appointment from 11/13/24 - provider out of office-th    stated

## 2024-10-25 ENCOUNTER — TELEPHONE (OUTPATIENT)
Dept: HEMATOLOGY | Age: 29
End: 2024-10-25

## 2024-10-25 NOTE — TELEPHONE ENCOUNTER
Rescheduled appointment from Friday, November 22, 24 @ 10:30 to Tuesday, November 19, 2024 @ 10:15- letter mailed-th

## 2024-11-11 DIAGNOSIS — C62.92 SEMINOMA OF LEFT TESTIS, STAGE 1 (HCC): Primary | ICD-10-CM

## 2024-11-15 ENCOUNTER — TELEPHONE (OUTPATIENT)
Dept: HEMATOLOGY | Age: 29
End: 2024-11-15

## 2024-11-15 NOTE — TELEPHONE ENCOUNTER
I called patient and left detailed voicemail about their appointment on 11/19/24. I made patient aware not to arrive any earlier than the appointment time and to come at the time of the follow up not the time of the lab appointment if it is different than the follow up appt time. I also made patient aware to eat and drink plenty of water to hydrate properly before coming to these appointments because this will make their lab draw much easier.  Made patient aware that we are now located at the [facility] at [address]. Located between [facility] and [facility]. Front entrance faces [facility].

## 2024-12-11 ENCOUNTER — TELEPHONE (OUTPATIENT)
Dept: HEMATOLOGY | Age: 29
End: 2024-12-11

## 2024-12-11 NOTE — TELEPHONE ENCOUNTER
Called pt. to remind them of appointment on 12/13/2024 and had to leave a detailed voicemail with appointment date and time. Reminded patient to just come at appointment time, and to not come at the lab appointment time. Reminded patient that we will not check them in any more than 30 minutes before appointment time. We have now moved to the [facility] that is located between our old office and the ER at the [facility]. Letting the Pt know that our front entrance faces the [place] and leaving our address. Reminded pt to eat well and be well hydrated for their labs.

## 2024-12-12 DIAGNOSIS — C62.92 SEMINOMA OF LEFT TESTIS, STAGE 1 (HCC): Primary | ICD-10-CM

## 2024-12-13 ENCOUNTER — HOSPITAL ENCOUNTER (OUTPATIENT)
Dept: INFUSION THERAPY | Age: 29
End: 2024-12-13

## 2024-12-29 ENCOUNTER — HOSPITAL ENCOUNTER (EMERGENCY)
Age: 29
Discharge: HOME | End: 2024-12-29
Attending: EMERGENCY MEDICINE
Payer: MEDICAID

## 2024-12-29 ENCOUNTER — APPOINTMENT (OUTPATIENT)
Dept: GENERAL RADIOLOGY | Age: 29
End: 2024-12-29
Payer: MEDICAID

## 2024-12-29 VITALS
SYSTOLIC BLOOD PRESSURE: 116 MMHG | BODY MASS INDEX: 24.43 KG/M2 | TEMPERATURE: 98.9 F | DIASTOLIC BLOOD PRESSURE: 79 MMHG | OXYGEN SATURATION: 97 % | HEART RATE: 62 BPM | WEIGHT: 156 LBS | RESPIRATION RATE: 17 BRPM

## 2024-12-29 DIAGNOSIS — F51.02 INSOMNIA DUE TO STRESS: Primary | ICD-10-CM

## 2024-12-29 LAB
ALBUMIN SERPL-MCNC: 4.3 G/DL (ref 3.5–5.2)
ALP SERPL-CCNC: 67 U/L (ref 40–129)
ALT SERPL-CCNC: 12 U/L (ref 5–41)
ANION GAP SERPL CALCULATED.3IONS-SCNC: 12 MMOL/L (ref 7–19)
AST SERPL-CCNC: 18 U/L (ref 5–40)
BASOPHILS # BLD: 0 K/UL (ref 0–0.2)
BASOPHILS NFR BLD: 0.5 % (ref 0–1)
BILIRUB SERPL-MCNC: 0.5 MG/DL (ref 0.2–1.2)
BUN SERPL-MCNC: 4 MG/DL (ref 6–20)
CALCIUM SERPL-MCNC: 9 MG/DL (ref 8.6–10)
CHLORIDE SERPL-SCNC: 101 MMOL/L (ref 98–111)
CO2 SERPL-SCNC: 25 MMOL/L (ref 22–29)
CREAT SERPL-MCNC: 0.8 MG/DL (ref 0.7–1.2)
EOSINOPHIL # BLD: 0.1 K/UL (ref 0–0.6)
EOSINOPHIL NFR BLD: 0.7 % (ref 0–5)
ERYTHROCYTE [DISTWIDTH] IN BLOOD BY AUTOMATED COUNT: 14 % (ref 11.5–14.5)
GLUCOSE SERPL-MCNC: 122 MG/DL (ref 70–99)
HCT VFR BLD AUTO: 30.9 % (ref 42–52)
HGB BLD-MCNC: 10.8 G/DL (ref 14–18)
IMM GRANULOCYTES # BLD: 0 K/UL
LYMPHOCYTES # BLD: 2.2 K/UL (ref 1.1–4.5)
LYMPHOCYTES NFR BLD: 26 % (ref 20–40)
MCH RBC QN AUTO: 29.6 PG (ref 27–31)
MCHC RBC AUTO-ENTMCNC: 35 G/DL (ref 33–37)
MCV RBC AUTO: 84.7 FL (ref 80–94)
MONOCYTES # BLD: 0.6 K/UL (ref 0–0.9)
MONOCYTES NFR BLD: 7.5 % (ref 0–10)
NEUTROPHILS # BLD: 5.5 K/UL (ref 1.5–7.5)
NEUTS SEG NFR BLD: 65.1 % (ref 50–65)
PLATELET # BLD AUTO: 252 K/UL (ref 130–400)
PMV BLD AUTO: 8.6 FL (ref 9.4–12.4)
POTASSIUM SERPL-SCNC: 3.6 MMOL/L (ref 3.5–5)
PROT SERPL-MCNC: 7.3 G/DL (ref 6.4–8.3)
RBC # BLD AUTO: 3.65 M/UL (ref 4.7–6.1)
SODIUM SERPL-SCNC: 138 MMOL/L (ref 136–145)
TROPONIN, HIGH SENSITIVITY: <6 NG/L (ref 0–22)
WBC # BLD AUTO: 8.4 K/UL (ref 4.8–10.8)

## 2024-12-29 PROCEDURE — 36415 COLL VENOUS BLD VENIPUNCTURE: CPT

## 2024-12-29 PROCEDURE — 80053 COMPREHEN METABOLIC PANEL: CPT

## 2024-12-29 PROCEDURE — 84484 ASSAY OF TROPONIN QUANT: CPT

## 2024-12-29 PROCEDURE — 6360000002 HC RX W HCPCS: Performed by: EMERGENCY MEDICINE

## 2024-12-29 PROCEDURE — 93005 ELECTROCARDIOGRAM TRACING: CPT | Performed by: EMERGENCY MEDICINE

## 2024-12-29 PROCEDURE — 85025 COMPLETE CBC W/AUTO DIFF WBC: CPT

## 2024-12-29 PROCEDURE — 96372 THER/PROPH/DIAG INJ SC/IM: CPT

## 2024-12-29 PROCEDURE — 99285 EMERGENCY DEPT VISIT HI MDM: CPT

## 2024-12-29 PROCEDURE — 71045 X-RAY EXAM CHEST 1 VIEW: CPT

## 2024-12-29 RX ORDER — HALOPERIDOL 5 MG/ML
5 INJECTION INTRAMUSCULAR ONCE
Status: COMPLETED | OUTPATIENT
Start: 2024-12-29 | End: 2024-12-29

## 2024-12-29 RX ADMIN — HALOPERIDOL LACTATE 5 MG: 5 INJECTION, SOLUTION INTRAMUSCULAR at 03:34

## 2024-12-29 ASSESSMENT — PAIN SCALES - GENERAL: PAINLEVEL_OUTOF10: 6

## 2024-12-29 ASSESSMENT — PAIN - FUNCTIONAL ASSESSMENT: PAIN_FUNCTIONAL_ASSESSMENT: 0-10

## 2024-12-29 ASSESSMENT — PAIN DESCRIPTION - DESCRIPTORS: DESCRIPTORS: TIGHTNESS

## 2024-12-29 ASSESSMENT — PAIN DESCRIPTION - LOCATION: LOCATION: CHEST

## 2024-12-29 NOTE — ED PROVIDER NOTES
[facility] EMERGENCY DEPT  eMERGENCY dEPARTMENT eNCOUnter      Pt Name: Steve Urena  MRN: [id number]  Birthdate 1995  Date of evaluation: 12/29/2024  Provider: Mushtaq Montelongo MD    CHIEF COMPLAINT       Chief Complaint   Patient presents with    Chest Pain     Patient reports chest tightness for the past couple of days.  Patient states he drank several energy drinks due to work      Tachycardia         HISTORY OF PRESENT ILLNESS   (Location/Symptom, Timing/Onset,Context/Setting, Quality, Duration, Modifying Factors, Severity)  Note limiting factors.   Steve Urena is a 29 y.o. male who presents to the emergency department for evaluation regarding feelings of tightness in his anterior chest.  Patient states that he noticed the symptoms after he drinks several energy drinks at work trying to stay up all night.  States that he has a lot of trouble sleeping during the day and only gets several hours of sleep.  States he then has a hard time staying awake at work as he works overnights.  Patient presents to the ED tonight with complaints of sharp, anterior chest pain and feelings of his heart is beating fast.  He has not had recent illnesses, fever or chills.  Patient denies vomiting or diarrhea.  No prior history of cardiac rhythm problems.    HPI    NursingNotes were reviewed.    REVIEW OF SYSTEMS    (2-9 systems for level 4, 10 or more for level 5)     Review of Systems   Constitutional:  Negative for chills and fever.   Respiratory:  Negative for cough and shortness of breath.    Cardiovascular:  Positive for chest pain and palpitations.   Gastrointestinal:  Negative for abdominal distention, abdominal pain, diarrhea, nausea and vomiting.   Neurological:  Negative for dizziness and syncope.   All other systems reviewed and are negative.           PAST MEDICALHISTORY     Past Medical History:   Diagnosis Date    Cancer (HCC)     Kidney stone          SURGICAL HISTORY       Past Surgical History:   Procedure Laterality Date  DIAGNOSIS/MDM:   Vitals:    Vitals:    12/29/24 0102 12/29/24 0114 12/29/24 0131 12/29/24 0202   BP: 122/83  120/80 116/79   Pulse: 61 61 62 62   Resp: 10  18 17   Temp: 98.9 °F (37.2 °C)      TempSrc: Oral      SpO2: 97%  96% 97%   Weight: 70.8 kg (156 lb)          MDM      Reassessment  ***    CONSULTS:  None    PROCEDURES:  Unless otherwise noted below, none     Procedures    FINAL IMPRESSION      1. Insomnia due to stress          DISPOSITION/PLAN   DISPOSITION Decision To Discharge 12/29/2024 03:51:59 AM   DISPOSITION CONDITION Stable           PATIENT REFERRED TO:  Mia Acosta MD  [address] [place] [address]      If symptoms worsen      DISCHARGE MEDICATIONS:  New Prescriptions    No medications on file          (Please note that portions of this note were completed with a voice recognition program.  Efforts were made to edit thedictations but occasionally words are mis-transcribed.)    Mushtaq Montelongo MD (electronically signed)  Attending Emergency Physician

## 2024-12-31 LAB
EKG P AXIS: 65 DEGREES
EKG P-R INTERVAL: 120 MS
EKG Q-T INTERVAL: 406 MS
EKG QRS DURATION: 80 MS
EKG QTC CALCULATION (BAZETT): 406 MS
EKG T AXIS: 58 DEGREES

## 2024-12-31 PROCEDURE — 93010 ELECTROCARDIOGRAM REPORT: CPT | Performed by: INTERNAL MEDICINE

## 2025-01-06 ASSESSMENT — ENCOUNTER SYMPTOMS
NAUSEA: 0
ABDOMINAL DISTENTION: 0
DIARRHEA: 0
COUGH: 0
ABDOMINAL PAIN: 0
VOMITING: 0
SHORTNESS OF BREATH: 0

## 2025-01-15 ENCOUNTER — HOSPITAL ENCOUNTER (EMERGENCY)
Age: 30
Discharge: HOME | End: 2025-01-15
Payer: MEDICAID

## 2025-01-15 ENCOUNTER — APPOINTMENT (OUTPATIENT)
Dept: CT IMAGING | Age: 30
End: 2025-01-15
Payer: MEDICAID

## 2025-01-15 VITALS
OXYGEN SATURATION: 98 % | HEART RATE: 59 BPM | HEIGHT: 68 IN | WEIGHT: 145 LBS | BODY MASS INDEX: 21.98 KG/M2 | TEMPERATURE: 98.4 F | RESPIRATION RATE: 16 BRPM | SYSTOLIC BLOOD PRESSURE: 109 MMHG | DIASTOLIC BLOOD PRESSURE: 74 MMHG

## 2025-01-15 DIAGNOSIS — K64.8 INTERNAL HEMORRHOIDS: Primary | ICD-10-CM

## 2025-01-15 LAB
ALBUMIN SERPL-MCNC: 4.6 G/DL (ref 3.5–5.2)
ALP SERPL-CCNC: 68 U/L (ref 40–129)
ALT SERPL-CCNC: 8 U/L (ref 5–41)
ANION GAP SERPL CALCULATED.3IONS-SCNC: 10 MMOL/L (ref 7–19)
AST SERPL-CCNC: 34 U/L (ref 5–40)
BACTERIA URNS QL MICRO: NEGATIVE /HPF
BASOPHILS # BLD: 0.1 K/UL (ref 0–0.2)
BASOPHILS NFR BLD: 0.8 % (ref 0–1)
BILIRUB SERPL-MCNC: 0.3 MG/DL (ref 0.2–1.2)
BILIRUB UR QL STRIP: NEGATIVE
BUN SERPL-MCNC: 8 MG/DL (ref 6–20)
CALCIUM SERPL-MCNC: 9.3 MG/DL (ref 8.6–10)
CHLORIDE SERPL-SCNC: 106 MMOL/L (ref 98–111)
CLARITY UR: CLEAR
CO2 SERPL-SCNC: 26 MMOL/L (ref 22–29)
COLOR UR: YELLOW
CREAT SERPL-MCNC: 0.7 MG/DL (ref 0.7–1.2)
CRYSTALS URNS MICRO: NORMAL /HPF
EOSINOPHIL # BLD: 0.1 K/UL (ref 0–0.6)
EOSINOPHIL NFR BLD: 1.7 % (ref 0–5)
EPI CELLS #/AREA URNS AUTO: 0 /HPF (ref 0–5)
ERYTHROCYTE [DISTWIDTH] IN BLOOD BY AUTOMATED COUNT: 13.9 % (ref 11.5–14.5)
GLUCOSE SERPL-MCNC: 77 MG/DL (ref 70–99)
GLUCOSE UR STRIP.AUTO-MCNC: NEGATIVE MG/DL
HCT VFR BLD AUTO: 34.1 % (ref 42–52)
HGB BLD-MCNC: 11.5 G/DL (ref 14–18)
HGB UR STRIP.AUTO-MCNC: NEGATIVE MG/L
HYALINE CASTS #/AREA URNS AUTO: 0 /HPF (ref 0–8)
IMM GRANULOCYTES # BLD: 0 K/UL
KETONES UR STRIP.AUTO-MCNC: NEGATIVE MG/DL
LEUKOCYTE ESTERASE UR QL STRIP.AUTO: ABNORMAL
LIPASE SERPL-CCNC: 68 U/L (ref 13–60)
LYMPHOCYTES # BLD: 2.5 K/UL (ref 1.1–4.5)
LYMPHOCYTES NFR BLD: 41.4 % (ref 20–40)
MCH RBC QN AUTO: 29.2 PG (ref 27–31)
MCHC RBC AUTO-ENTMCNC: 33.7 G/DL (ref 33–37)
MCV RBC AUTO: 86.5 FL (ref 80–94)
MONOCYTES # BLD: 0.7 K/UL (ref 0–0.9)
MONOCYTES NFR BLD: 11.3 % (ref 0–10)
NEUTROPHILS # BLD: 2.7 K/UL (ref 1.5–7.5)
NEUTS SEG NFR BLD: 44.6 % (ref 50–65)
NITRITE UR QL STRIP.AUTO: NEGATIVE
PH UR STRIP.AUTO: 6.5 [PH] (ref 5–8)
PLATELET # BLD AUTO: 240 K/UL (ref 130–400)
PMV BLD AUTO: 9.3 FL (ref 9.4–12.4)
POTASSIUM SERPL-SCNC: 4.1 MMOL/L (ref 3.5–5)
PROT SERPL-MCNC: 7.8 G/DL (ref 6.4–8.3)
PROT UR STRIP.AUTO-MCNC: NEGATIVE MG/DL
RBC # BLD AUTO: 3.94 M/UL (ref 4.7–6.1)
RBC #/AREA URNS AUTO: 0 /HPF (ref 0–4)
SODIUM SERPL-SCNC: 142 MMOL/L (ref 136–145)
SP GR UR STRIP.AUTO: 1.01 (ref 1–1.03)
UROBILINOGEN UR STRIP.AUTO-MCNC: 0.2 E.U./DL
WBC # BLD AUTO: 5.9 K/UL (ref 4.8–10.8)
WBC #/AREA URNS AUTO: 1 /HPF (ref 0–5)

## 2025-01-15 PROCEDURE — 99285 EMERGENCY DEPT VISIT HI MDM: CPT

## 2025-01-15 PROCEDURE — 74177 CT ABD & PELVIS W/CONTRAST: CPT

## 2025-01-15 PROCEDURE — 6360000004 HC RX CONTRAST MEDICATION: Performed by: PHYSICIAN ASSISTANT

## 2025-01-15 PROCEDURE — 85025 COMPLETE CBC W/AUTO DIFF WBC: CPT

## 2025-01-15 PROCEDURE — 83690 ASSAY OF LIPASE: CPT

## 2025-01-15 PROCEDURE — 6370000000 HC RX 637 (ALT 250 FOR IP): Performed by: PHYSICIAN ASSISTANT

## 2025-01-15 PROCEDURE — 36415 COLL VENOUS BLD VENIPUNCTURE: CPT

## 2025-01-15 PROCEDURE — 81001 URINALYSIS AUTO W/SCOPE: CPT

## 2025-01-15 PROCEDURE — 80053 COMPREHEN METABOLIC PANEL: CPT

## 2025-01-15 RX ORDER — HYDROCORTISONE ACETATE 25 MG/1
25 SUPPOSITORY RECTAL 2 TIMES DAILY
Status: DISCONTINUED | OUTPATIENT
Start: 2025-01-15 | End: 2025-01-15 | Stop reason: HOSPADM

## 2025-01-15 RX ORDER — DIAPER,BRIEF,INFANT-TODD,DISP
EACH MISCELLANEOUS
Qty: 56 G | Refills: 1 | Status: SHIPPED | OUTPATIENT
Start: 2025-01-15

## 2025-01-15 RX ORDER — IOPAMIDOL 755 MG/ML
70 INJECTION, SOLUTION INTRAVASCULAR
Status: COMPLETED | OUTPATIENT
Start: 2025-01-15 | End: 2025-01-15

## 2025-01-15 RX ADMIN — IOPAMIDOL 70 ML: 755 INJECTION, SOLUTION INTRAVENOUS at 18:39

## 2025-01-15 RX ADMIN — HYDROCORTISONE ACETATE 25 MG: 25 SUPPOSITORY RECTAL at 19:35

## 2025-01-15 ASSESSMENT — ENCOUNTER SYMPTOMS
RECTAL PAIN: 1
PHOTOPHOBIA: 0
APNEA: 0
BLOOD IN STOOL: 1
BACK PAIN: 0
COLOR CHANGE: 0
COUGH: 0
SHORTNESS OF BREATH: 0
EYE DISCHARGE: 0
EYE ITCHING: 0

## 2025-01-15 NOTE — ED PROVIDER NOTES
[facility] EMERGENCY DEPARTMENT  eMERGENCYdEPARTMENT eNCOUnter      Pt Name: Steve Urena  MRN: [id number]  Birthdate 1995  Date of evaluation: 1/15/2025  Provider:PIERCE Grace    CHIEF COMPLAINT       Chief Complaint   Patient presents with    Rectal Bleeding     Bright red bleeding, rectal pain          HISTORY OF PRESENT ILLNESS  (Location/Symptom, Timing/Onset, Context/Setting, Quality, Duration, Modifying Factors, Severity.)   Steve Urena is a 29 y.o. male who presents to the emergency department with complaints of bright red bleeding acute onset with rectal pain started yesteday LLQ pain surgical scar noted from prior testicle removal. No active treatment he is followed by Dr Corral with oncology no general practioner. He cannot tolerated dairy or milk well some lactose intolerance endorsed prone to constipation.    HPI    Nursing Notes were reviewed and I agree.    REVIEW OF SYSTEMS    (2-9 systems for level 4, 10 or more for level 5)     Review of Systems   Constitutional:  Negative for activity change, appetite change, chills and fever.   HENT:  Negative for congestion and dental problem.    Eyes:  Negative for photophobia, discharge and itching.   Respiratory:  Negative for apnea, cough and shortness of breath.    Cardiovascular:  Negative for chest pain.   Gastrointestinal:  Positive for blood in stool and rectal pain.   Musculoskeletal:  Negative for arthralgias, back pain, gait problem, myalgias and neck pain.   Skin:  Negative for color change, pallor and rash.   Neurological:  Negative for dizziness, seizures and syncope.   Psychiatric/Behavioral:  Negative for agitation. The patient is not nervous/anxious.         Except as noted above the remainder of the review of systems was reviewed and negative.       PAST MEDICAL HISTORY     Past Medical History:   Diagnosis Date    Cancer (HCC)     Kidney stone          SURGICAL HISTORY       Past Surgical History:   Procedure Laterality Date

## 2025-01-16 NOTE — DISCHARGE INSTRUCTIONS
Plan on sitz bath OTC with high dose fiber and stool softener with topical steroid cream  Will need surgical follow up

## 2025-06-25 ENCOUNTER — APPOINTMENT (OUTPATIENT)
Dept: CT IMAGING | Age: 30
End: 2025-06-25
Payer: MEDICAID

## 2025-06-25 ENCOUNTER — HOSPITAL ENCOUNTER (EMERGENCY)
Age: 30
Discharge: HOME | End: 2025-06-25
Payer: MEDICAID

## 2025-06-25 VITALS
HEIGHT: 68 IN | OXYGEN SATURATION: 95 % | DIASTOLIC BLOOD PRESSURE: 92 MMHG | HEART RATE: 67 BPM | SYSTOLIC BLOOD PRESSURE: 140 MMHG | RESPIRATION RATE: 17 BRPM | WEIGHT: 145 LBS | TEMPERATURE: 98.2 F | BODY MASS INDEX: 21.98 KG/M2

## 2025-06-25 DIAGNOSIS — R11.2 VOMITING WITH NAUSEA, NOT INTRACTABLE: ICD-10-CM

## 2025-06-25 DIAGNOSIS — G47.00 INSOMNIA, UNSPECIFIED TYPE: Primary | ICD-10-CM

## 2025-06-25 DIAGNOSIS — R10.13 ABDOMINAL PAIN, EPIGASTRIC: ICD-10-CM

## 2025-06-25 DIAGNOSIS — F41.1 ANXIETY STATE: ICD-10-CM

## 2025-06-25 LAB
ALBUMIN SERPL-MCNC: 5.2 G/DL (ref 3.5–5.2)
ALP SERPL-CCNC: 94 U/L (ref 40–129)
ALT SERPL-CCNC: 16 U/L (ref 10–50)
AMPHET UR QL SCN: NEGATIVE
ANION GAP SERPL CALCULATED.3IONS-SCNC: 18 MMOL/L (ref 8–16)
AST SERPL-CCNC: 36 U/L (ref 10–50)
BACTERIA URNS QL MICRO: NEGATIVE /HPF
BARBITURATES UR QL SCN: NEGATIVE
BASOPHILS # BLD: 0 K/UL (ref 0–0.2)
BASOPHILS NFR BLD: 0.4 % (ref 0–1)
BENZODIAZ UR QL SCN: NEGATIVE
BILIRUB SERPL-MCNC: 1 MG/DL (ref 0.2–1.2)
BILIRUB UR QL STRIP: NEGATIVE
BUN SERPL-MCNC: 14 MG/DL (ref 6–20)
BUPRENORPHINE URINE: NEGATIVE
CALCIUM SERPL-MCNC: 10.5 MG/DL (ref 8.6–10)
CANNABINOIDS UR QL SCN: POSITIVE
CHLORIDE SERPL-SCNC: 101 MMOL/L (ref 98–107)
CLARITY UR: CLEAR
CO2 SERPL-SCNC: 20 MMOL/L (ref 22–29)
COCAINE UR QL SCN: NEGATIVE
COLOR UR: YELLOW
CREAT SERPL-MCNC: 0.9 MG/DL (ref 0.7–1.2)
CRYSTALS URNS MICRO: NORMAL /HPF
DRUG SCREEN COMMENT UR-IMP: ABNORMAL
EOSINOPHIL # BLD: 0 K/UL (ref 0–0.6)
EOSINOPHIL NFR BLD: 0.6 % (ref 0–5)
EPI CELLS #/AREA URNS AUTO: 0 /HPF (ref 0–5)
ERYTHROCYTE [DISTWIDTH] IN BLOOD BY AUTOMATED COUNT: 14.4 % (ref 11.5–14.5)
ETHANOLAMINE SERPL-MCNC: <11 MG/DL (ref 0–11)
FENTANYL SCREEN, URINE: NEGATIVE
GLUCOSE SERPL-MCNC: 101 MG/DL (ref 70–99)
GLUCOSE UR STRIP.AUTO-MCNC: NEGATIVE MG/DL
HCT VFR BLD AUTO: 41.9 % (ref 42–52)
HGB BLD-MCNC: 14.5 G/DL (ref 14–18)
HGB UR STRIP.AUTO-MCNC: NEGATIVE MG/L
HYALINE CASTS #/AREA URNS AUTO: 0 /HPF (ref 0–8)
IMM GRANULOCYTES # BLD: 0 K/UL
KETONES UR STRIP.AUTO-MCNC: 15 MG/DL
LEUKOCYTE ESTERASE UR QL STRIP.AUTO: NEGATIVE
LIPASE SERPL-CCNC: 39 U/L (ref 13–60)
LYMPHOCYTES # BLD: 2.3 K/UL (ref 1.1–4.5)
LYMPHOCYTES NFR BLD: 33.4 % (ref 20–40)
MCH RBC QN AUTO: 27.9 PG (ref 27–31)
MCHC RBC AUTO-ENTMCNC: 34.6 G/DL (ref 33–37)
MCV RBC AUTO: 80.6 FL (ref 80–94)
METHADONE UR QL SCN: NEGATIVE
METHAMPHETAMINE, URINE: NEGATIVE
MONOCYTES # BLD: 0.8 K/UL (ref 0–0.9)
MONOCYTES NFR BLD: 11 % (ref 0–10)
NEUTROPHILS # BLD: 3.7 K/UL (ref 1.5–7.5)
NEUTS SEG NFR BLD: 54.3 % (ref 50–65)
NITRITE UR QL STRIP.AUTO: NEGATIVE
OPIATES UR QL SCN: NEGATIVE
OXYCODONE UR QL SCN: NEGATIVE
PCP UR QL SCN: NEGATIVE
PH UR STRIP.AUTO: 6.5 [PH] (ref 5–8)
PLATELET # BLD AUTO: 281 K/UL (ref 130–400)
PMV BLD AUTO: 10 FL (ref 9.4–12.4)
POTASSIUM SERPL-SCNC: 3.8 MMOL/L (ref 3.5–5.1)
PROT SERPL-MCNC: 9.1 G/DL (ref 6.4–8.3)
PROT UR STRIP.AUTO-MCNC: 30 MG/DL
RBC # BLD AUTO: 5.2 M/UL (ref 4.7–6.1)
RBC #/AREA URNS AUTO: 1 /HPF (ref 0–4)
SODIUM SERPL-SCNC: 139 MMOL/L (ref 136–145)
SP GR UR STRIP.AUTO: 1.01 (ref 1–1.03)
TRICYCLIC ANTIDEPRESSANTS, UR: NEGATIVE
UROBILINOGEN UR STRIP.AUTO-MCNC: 1 E.U./DL
WBC # BLD AUTO: 6.8 K/UL (ref 4.8–10.8)
WBC #/AREA URNS AUTO: 1 /HPF (ref 0–5)

## 2025-06-25 PROCEDURE — 83690 ASSAY OF LIPASE: CPT

## 2025-06-25 PROCEDURE — 80307 DRUG TEST PRSMV CHEM ANLYZR: CPT

## 2025-06-25 PROCEDURE — G0480 DRUG TEST DEF 1-7 CLASSES: HCPCS

## 2025-06-25 PROCEDURE — 80053 COMPREHEN METABOLIC PANEL: CPT

## 2025-06-25 PROCEDURE — 82077 ASSAY SPEC XCP UR&BREATH IA: CPT

## 2025-06-25 PROCEDURE — 6360000004 HC RX CONTRAST MEDICATION: Performed by: NURSE PRACTITIONER

## 2025-06-25 PROCEDURE — 81001 URINALYSIS AUTO W/SCOPE: CPT

## 2025-06-25 PROCEDURE — 99285 EMERGENCY DEPT VISIT HI MDM: CPT

## 2025-06-25 PROCEDURE — 74177 CT ABD & PELVIS W/CONTRAST: CPT

## 2025-06-25 PROCEDURE — 6360000002 HC RX W HCPCS: Performed by: NURSE PRACTITIONER

## 2025-06-25 PROCEDURE — 85025 COMPLETE CBC W/AUTO DIFF WBC: CPT

## 2025-06-25 PROCEDURE — 36415 COLL VENOUS BLD VENIPUNCTURE: CPT

## 2025-06-25 PROCEDURE — 2580000003 HC RX 258: Performed by: NURSE PRACTITIONER

## 2025-06-25 PROCEDURE — 96374 THER/PROPH/DIAG INJ IV PUSH: CPT

## 2025-06-25 RX ORDER — 0.9 % SODIUM CHLORIDE 0.9 %
1000 INTRAVENOUS SOLUTION INTRAVENOUS ONCE
Status: COMPLETED | OUTPATIENT
Start: 2025-06-25 | End: 2025-06-25

## 2025-06-25 RX ORDER — LORAZEPAM 2 MG/ML
2 INJECTION INTRAMUSCULAR ONCE
Status: COMPLETED | OUTPATIENT
Start: 2025-06-25 | End: 2025-06-25

## 2025-06-25 RX ORDER — IOPAMIDOL 755 MG/ML
90 INJECTION, SOLUTION INTRAVASCULAR
Status: COMPLETED | OUTPATIENT
Start: 2025-06-25 | End: 2025-06-25

## 2025-06-25 RX ADMIN — SODIUM CHLORIDE 1000 ML: 9 INJECTION, SOLUTION INTRAVENOUS at 21:30

## 2025-06-25 RX ADMIN — LORAZEPAM 2 MG: 2 INJECTION INTRAMUSCULAR; INTRAVENOUS at 22:18

## 2025-06-25 RX ADMIN — IOPAMIDOL 90 ML: 755 INJECTION, SOLUTION INTRAVENOUS at 22:04

## 2025-06-25 ASSESSMENT — ENCOUNTER SYMPTOMS: ABDOMINAL PAIN: 1

## 2025-06-26 NOTE — ED NOTES
Patient encouraged to give urine sample. Patient states he is unable to at this time he was trying earlier and also couldn't urinate.

## 2025-06-26 NOTE — ED PROVIDER NOTES
[facility] EMERGENCY DEPARTMENT    Pt Name: Steve Urena  MRN: [id number]  Birthdate 1995  Date of evaluation: 6/25/2025    As physician-in-triage, I performed a medical screening history and physical examination on this patient.    HISTORY OF PRESENT ILLNESS  Steve Urena is a 30 y.o. male with complaint of difficulty sleeping. Says he can't eat and his stomach is churning and he has a headache. Says he drinks red bulls when he works at night and initially thought that was the issue, but recently hasn't been able to sleep at all. Hasn't worked since last Wednesday and hasn't drank energy drinks since then but still unable to sleep. Per medical records, has h/o substance induced psychotic disorder and prior left testicle mass.  Smokes cigarettes. Occasional marijuana use.      PHYSICAL EXAM  BP (!) 140/92   Pulse 67   Temp 98.2 °F (36.8 °C) (Temporal)   Resp 17   Ht 1.727 m (5' 8\")   Wt 65.8 kg (145 lb)   SpO2 95%   BMI 22.05 kg/m²     On exam, the patient appears in no acute distress. Speech is clear. Breathing is unlabored.  Moves all extremities and is ambulatory.    In order to expedite ED evaluation, initial laboratory/radiology orders as indicated were placed at this time.  Due to ED capacity, patient was returned to the waiting room pending bed availability.         Labs Reviewed   CBC WITH AUTO DIFFERENTIAL   COMPREHENSIVE METABOLIC PANEL   URINALYSIS   ETHANOL   DRUG SCRN, BUPRENORPHINE     Medications - No data to display  No orders to display       1. Insomnia, unspecified type              (Please note that portions of this note were completed with a voice recognition program.  Efforts were made to edit the dictations but occasionally words are mis-transcribed.)    Nba Kelly Jr, MD (electronically signed)  Attending Emergency Physician              Nba Kelly Jr., MD  06/25/25 6017    
   All other components within normal limits   DRUG SCRN, BUPRENORPHINE - Abnormal; Notable for the following components:    Cannabinoid Scrn, Ur POSITIVE (*)     All other components within normal limits   ETHANOL   LIPASE   MICROSCOPIC URINALYSIS       All other labs were within normal range or not returned as of this dictation.    EMERGENCY DEPARTMENT COURSE and DIFFERENTIALDIAGNOSIS/MDM:   Vitals:    Vitals:    06/25/25 1820   BP: (!) 140/92   Pulse: 67   Resp: 17   Temp: 98.2 °F (36.8 °C)   TempSrc: Temporal   SpO2: 95%   Weight: 65.8 kg (145 lb)   Height: 1.727 m (5' 8\")           MDM  Number of Diagnoses or Management Options  Abdominal pain, epigastric: new and requires workup  Anxiety state: new and requires workup  Insomnia, unspecified type: new and requires workup  Vomiting with nausea, not intractable: new and requires workup  Diagnosis management comments: Steve Urena is a 30 y.o. male who presents to the emergency department with abd pain and insomnia.  Drinks energy drinks and hasn't been sleeping or eating.  Talking nonstop.  Complains of abd pain and vomiting.      History of testicular cancer  White count 6.8, hemoglobin 14.5, platelets 281,000.  Patient's lipase is 39.  His gap is 18 and CO2 is 20.  patient is positive for cannabinoids.  CT abdomen does not show any abnormality.  Patient's had a liter of fluids and anxiety medicine and feels better.  Discussed with the patient how marijuana can cause abdominal pain and vomiting.  Patient needs to decrease his use of energy drinks.  Will discharge home       Amount and/or Complexity of Data Reviewed  Clinical lab tests: ordered and reviewed  Tests in the radiology section of CPT®: ordered  Tests in the medicine section of CPT®: ordered and reviewed               CONSULTS:  None    PROCEDURES:  Unless otherwise noted below, none     Procedures    FINAL IMPRESSION      1. Insomnia, unspecified type    2. Abdominal pain, epigastric    3. Vomiting

## 2025-06-28 ENCOUNTER — HOSPITAL ENCOUNTER (EMERGENCY)
Age: 30
Discharge: HOME | End: 2025-06-28
Payer: MEDICAID

## 2025-06-28 VITALS
TEMPERATURE: 98.7 F | DIASTOLIC BLOOD PRESSURE: 89 MMHG | RESPIRATION RATE: 18 BRPM | OXYGEN SATURATION: 98 % | SYSTOLIC BLOOD PRESSURE: 129 MMHG | BODY MASS INDEX: 22.05 KG/M2 | HEART RATE: 66 BPM | WEIGHT: 145 LBS

## 2025-06-28 DIAGNOSIS — F41.1 ANXIETY STATE: ICD-10-CM

## 2025-06-28 DIAGNOSIS — G47.00 INSOMNIA, UNSPECIFIED TYPE: Primary | ICD-10-CM

## 2025-06-28 LAB
ALBUMIN SERPL-MCNC: 4.7 G/DL (ref 3.5–5.2)
ALP SERPL-CCNC: 81 U/L (ref 40–129)
ALT SERPL-CCNC: 15 U/L (ref 10–50)
AMPHET UR QL SCN: NEGATIVE
ANION GAP SERPL CALCULATED.3IONS-SCNC: 13 MMOL/L (ref 8–16)
AST SERPL-CCNC: 29 U/L (ref 10–50)
BACTERIA URNS QL MICRO: NEGATIVE /HPF
BARBITURATES UR QL SCN: NEGATIVE
BASOPHILS # BLD: 0 K/UL (ref 0–0.2)
BASOPHILS NFR BLD: 0.7 % (ref 0–1)
BENZODIAZ UR QL SCN: POSITIVE
BILIRUB SERPL-MCNC: 1.1 MG/DL (ref 0.2–1.2)
BILIRUB UR QL STRIP: NEGATIVE
BUN SERPL-MCNC: 11 MG/DL (ref 6–20)
BUPRENORPHINE URINE: NEGATIVE
CALCIUM SERPL-MCNC: 9.9 MG/DL (ref 8.6–10)
CANNABINOIDS UR QL SCN: POSITIVE
CHLORIDE SERPL-SCNC: 101 MMOL/L (ref 98–107)
CLARITY UR: CLEAR
CO2 SERPL-SCNC: 21 MMOL/L (ref 22–29)
COCAINE UR QL SCN: NEGATIVE
COLOR UR: YELLOW
CREAT SERPL-MCNC: 0.8 MG/DL (ref 0.7–1.2)
CRYSTALS URNS MICRO: NORMAL /HPF
DRUG SCREEN COMMENT UR-IMP: ABNORMAL
EOSINOPHIL # BLD: 0.1 K/UL (ref 0–0.6)
EOSINOPHIL NFR BLD: 1.5 % (ref 0–5)
EPI CELLS #/AREA URNS AUTO: 0 /HPF (ref 0–5)
ERYTHROCYTE [DISTWIDTH] IN BLOOD BY AUTOMATED COUNT: 14.3 % (ref 11.5–14.5)
ETHANOLAMINE SERPL-MCNC: <11 MG/DL (ref 0–11)
FENTANYL SCREEN, URINE: NEGATIVE
GLUCOSE SERPL-MCNC: 113 MG/DL (ref 70–99)
GLUCOSE UR STRIP.AUTO-MCNC: NEGATIVE MG/DL
HCT VFR BLD AUTO: 37.8 % (ref 42–52)
HGB BLD-MCNC: 13.2 G/DL (ref 14–18)
HGB UR STRIP.AUTO-MCNC: NEGATIVE MG/L
HYALINE CASTS #/AREA URNS AUTO: 0 /HPF (ref 0–8)
IMM GRANULOCYTES # BLD: 0 K/UL
KETONES UR STRIP.AUTO-MCNC: ABNORMAL MG/DL
LEUKOCYTE ESTERASE UR QL STRIP.AUTO: NEGATIVE
LYMPHOCYTES # BLD: 1.5 K/UL (ref 1.1–4.5)
LYMPHOCYTES NFR BLD: 33.6 % (ref 20–40)
MCH RBC QN AUTO: 28.5 PG (ref 27–31)
MCHC RBC AUTO-ENTMCNC: 34.9 G/DL (ref 33–37)
MCV RBC AUTO: 81.6 FL (ref 80–94)
METHADONE UR QL SCN: NEGATIVE
METHAMPHETAMINE, URINE: NEGATIVE
MONOCYTES # BLD: 0.6 K/UL (ref 0–0.9)
MONOCYTES NFR BLD: 12.8 % (ref 0–10)
NEUTROPHILS # BLD: 2.3 K/UL (ref 1.5–7.5)
NEUTS SEG NFR BLD: 51.4 % (ref 50–65)
NITRITE UR QL STRIP.AUTO: NEGATIVE
OPIATES UR QL SCN: NEGATIVE
OXYCODONE UR QL SCN: NEGATIVE
PCP UR QL SCN: NEGATIVE
PH UR STRIP.AUTO: 6.5 [PH] (ref 5–8)
PLATELET # BLD AUTO: 222 K/UL (ref 130–400)
PMV BLD AUTO: 10 FL (ref 9.4–12.4)
POTASSIUM SERPL-SCNC: 3.8 MMOL/L (ref 3.5–5.1)
PROT SERPL-MCNC: 7.8 G/DL (ref 6.4–8.3)
PROT UR STRIP.AUTO-MCNC: 30 MG/DL
RBC # BLD AUTO: 4.63 M/UL (ref 4.7–6.1)
RBC #/AREA URNS AUTO: 2 /HPF (ref 0–4)
SARS-COV-2 RDRP RESP QL NAA+PROBE: NOT DETECTED
SODIUM SERPL-SCNC: 135 MMOL/L (ref 136–145)
SP GR UR STRIP.AUTO: 1.02 (ref 1–1.03)
TRICYCLIC ANTIDEPRESSANTS, UR: NEGATIVE
UROBILINOGEN UR STRIP.AUTO-MCNC: 1 E.U./DL
WBC # BLD AUTO: 4.5 K/UL (ref 4.8–10.8)
WBC #/AREA URNS AUTO: 2 /HPF (ref 0–5)

## 2025-06-28 PROCEDURE — 99284 EMERGENCY DEPT VISIT MOD MDM: CPT

## 2025-06-28 PROCEDURE — 82077 ASSAY SPEC XCP UR&BREATH IA: CPT

## 2025-06-28 PROCEDURE — 2580000003 HC RX 258: Performed by: NURSE PRACTITIONER

## 2025-06-28 PROCEDURE — 85025 COMPLETE CBC W/AUTO DIFF WBC: CPT

## 2025-06-28 PROCEDURE — 96374 THER/PROPH/DIAG INJ IV PUSH: CPT

## 2025-06-28 PROCEDURE — 6360000002 HC RX W HCPCS: Performed by: NURSE PRACTITIONER

## 2025-06-28 PROCEDURE — 80307 DRUG TEST PRSMV CHEM ANLYZR: CPT

## 2025-06-28 PROCEDURE — 87635 SARS-COV-2 COVID-19 AMP PRB: CPT

## 2025-06-28 PROCEDURE — G0480 DRUG TEST DEF 1-7 CLASSES: HCPCS

## 2025-06-28 PROCEDURE — 80053 COMPREHEN METABOLIC PANEL: CPT

## 2025-06-28 PROCEDURE — 36415 COLL VENOUS BLD VENIPUNCTURE: CPT

## 2025-06-28 PROCEDURE — 81001 URINALYSIS AUTO W/SCOPE: CPT

## 2025-06-28 RX ORDER — 0.9 % SODIUM CHLORIDE 0.9 %
1000 INTRAVENOUS SOLUTION INTRAVENOUS ONCE
Status: COMPLETED | OUTPATIENT
Start: 2025-06-28 | End: 2025-06-28

## 2025-06-28 RX ORDER — ONDANSETRON 2 MG/ML
4 INJECTION INTRAMUSCULAR; INTRAVENOUS ONCE
Status: COMPLETED | OUTPATIENT
Start: 2025-06-28 | End: 2025-06-28

## 2025-06-28 RX ORDER — TRAZODONE HYDROCHLORIDE 50 MG/1
50 TABLET ORAL NIGHTLY PRN
Qty: 14 TABLET | Refills: 0 | Status: SHIPPED | OUTPATIENT
Start: 2025-06-28 | End: 2025-07-12

## 2025-06-28 RX ADMIN — ONDANSETRON 4 MG: 2 INJECTION, SOLUTION INTRAMUSCULAR; INTRAVENOUS at 10:47

## 2025-06-28 RX ADMIN — SODIUM CHLORIDE 1000 ML: 0.9 INJECTION, SOLUTION INTRAVENOUS at 10:47

## 2025-06-28 ASSESSMENT — LIFESTYLE VARIABLES
HOW MANY STANDARD DRINKS CONTAINING ALCOHOL DO YOU HAVE ON A TYPICAL DAY: PATIENT DOES NOT DRINK
HOW OFTEN DO YOU HAVE A DRINK CONTAINING ALCOHOL: NEVER

## 2025-06-28 ASSESSMENT — ENCOUNTER SYMPTOMS
ABDOMINAL DISTENTION: 0
DIARRHEA: 0
NAUSEA: 1
RESPIRATORY NEGATIVE: 1
CONSTIPATION: 0
VOMITING: 1
ABDOMINAL PAIN: 1

## 2025-06-28 ASSESSMENT — PATIENT HEALTH QUESTIONNAIRE - PHQ9
1. LITTLE INTEREST OR PLEASURE IN DOING THINGS: MORE THAN HALF THE DAYS
SUM OF ALL RESPONSES TO PHQ QUESTIONS 1-9: 2
2. FEELING DOWN, DEPRESSED OR HOPELESS: NOT AT ALL
SUM OF ALL RESPONSES TO PHQ QUESTIONS 1-9: 2

## 2025-06-28 NOTE — ED PROVIDER NOTES
[facility] EMERGENCY DEPARTMENT  EMERGENCY DEPARTMENT ENCOUNTER      Pt Name: Steve Urena  MRN: [id number]  Birthdate 1995  Date of evaluation: 6/28/2025  Provider: CORRINE Florence NP    CHIEF COMPLAINT       Chief Complaint   Patient presents with    Abdominal Pain     Abd pain and headache x2 weeks    Insomnia     \"I'm addicted to red bull and I drink them and I can't sleep\", mother states that patient needs \"inpatient help\" for his tearfulness and thinks that he is not mentally stable         HISTORY OF PRESENT ILLNESS   (Location/Symptom, Timing/Onset,Context/Setting, Quality, Duration, Modifying Factors, Severity)  Note limiting factors.   Steve Urena is a 30 y.o. male who presents to the emergency department with several complaints including \"I am addicted to red bull and cannot sleep\".  Patient reports feeling anxious and was up all of last night and could not stop crying.  He is accompanied by his mother today who feels that he needs inpatient psychiatric care as insomnia and crying/anxiety has become more prevalent.  Patient has history of testicular cancer and had a testicle removed.  He denies any testicular pain or lower abdominal pain but states that his stomach is upset and he vomits easily and does not have any appetite.  Patient also admits to marijuana use.        The history is provided by the patient and a relative.       NursingNotes were reviewed.    REVIEW OF SYSTEMS    (2-9 systems for level 4, 10 or more for level 5)     Review of Systems   Constitutional:         As per HPI   Respiratory: Negative.     Cardiovascular: Negative.    Gastrointestinal:  Positive for abdominal pain, nausea and vomiting. Negative for abdominal distention, constipation and diarrhea.   Neurological:  Negative for dizziness and light-headedness.        Insomnia   Psychiatric/Behavioral:  Negative for hallucinations, self-injury and suicidal ideas. The patient is nervous/anxious.    All other systems  presented to the emergency department with complaint of insomnia and several episodes of nausea and vomiting.  Patient was evaluated for similar symptoms 2 days ago and had a complete workup that included a CAT scan.  CT was without any acute intra-abdominal abnormalities.  Patient reports that he believes he is addicted to red bull and drinks these excessively.  He reports being awake all night long and keeping his grandmother awake as well.  CBC and CMP today are unremarkable.  Urinalysis also unremarkable and urine drug screen is positive for cannabinoids and benzodiazepines which patient denies that he takes.  Vital signs are stable and patient is afebrile.  Ethanol level is negative, COVID is negative.  Behavioral health consultation performed and there is no criteria for inpatient admission for this patient.  He is not homicidal, suicidal, delusional, or experiencing hallucinations.  Discussed use of red bull and other energy drinks as the likely cause for his stomach upset in the absence of eating food.  Resources provided for patient to follow-up regarding anxiety and insomnia.  Will prescribe a 2-week supply of trazodone for sleep.  Patient agreeable to treatment and discharge plan.       Amount and/or Complexity of Data Reviewed  Clinical lab tests: ordered and reviewed    Patient Progress  Patient progress: stable             CONSULTS:  None    PROCEDURES:  Unless otherwise notedbelow, none     Procedures      FINAL IMPRESSION     1. Insomnia, unspecified type    2. Anxiety state          DISPOSITION/PLAN   DISPOSITION Decision To Discharge 06/28/2025 12:38:07 PM   DISPOSITION CONDITION Stable           No notes of EC Admission Criteria type on file.    PATIENT REFERRED TO:  No follow-up provider specified.    DISCHARGE MEDICATIONS:  Discharge Medication List as of 6/28/2025 12:47 PM        START taking these medications    Details   traZODone (DESYREL) 50 MG tablet Take 1 tablet by mouth nightly as

## 2025-06-28 NOTE — PROGRESS NOTES
[facility] ADULT INITIAL INTAKE ASSESSMENT     6/28/25    Steve Urena ,a 30 y.o. male, presents to the ED for a psychiatric assessment.     ED Arrival time: 0948  ED physician: Adi Oliveros  [facility] Notification time: 1145  [facility] Assessment start time: 1155  Psychiatrist call time: 1220  Spoke with Dr. Zavala    Patient is referred by: grandmother    Reason for visit to ED - Presenting problem:     PT states reason for ED visit, \"For the last two weeks I have not had an appetite, sleep deprivation, my stomach feels like something is stinging or shocking me. Then at night it's like I have sleep paralysis. I am awake but I cannot move.\" Pt states he is addicted to energy drinks. Pt works overnights. Pt denies SI, HI, and AVH. Pt has increased stress due to his medical complaints. No previous suicide attempts. Pt has been admitted here once in 2022. Pt used to go to [facility], no longer goes. Does not take any medication. Pt was diagnosed with insomnia and depression. Pt denies drug and alcohol use. Pt lives with his grandmother. Pt is calm and cooperative. Pt does not appear psychotic, delusional, or paranoid.     Duration of symptoms: health issues for 2 weeks, but no psychiatric symptoms    Current Stressors: health    C-SSRS Completed: yes  Risk Assessment Completed:yes  Risk of Suicide: No Risk  JENNY  discussed C-SSRS and Risk Assessment Findings with the Provider: The 1:1 order will be discontinued by the provider on admission to the Behavioral Health Unit due to the following reasons: 1. Patient is on 15-minute safety checks 2. Safety features on the unit. 3. Patient reported no thoughts of harming self while on the unit.\"  Previous safety concerns while on unit? No    SI:  denies   Plan: no   If yes, describe:  Past SI attempts: no   If yes, when and how many times:  Describe suicide attempts:   HI: denies  If yes describe:   Delusions: denies  If yes describe:   Hallucinations: denies   If

## 2025-06-28 NOTE — DISCHARGE INSTRUCTIONS
It is very important that you stop drinking Red Bull. Also reduce caffeine intake.  Both products have the ability to reduce your appetite and make you have an upset stomach.  Trazodone as prescribed for sleep.  Follow-up with counselor from resources provided.  Be sure you are drinking plenty of water.  Return to ER for any new, worsening, or change in condition.

## 2025-08-16 ENCOUNTER — HOSPITAL ENCOUNTER (EMERGENCY)
Age: 30
Discharge: HOME | End: 2025-08-16
Payer: MEDICAID

## 2025-08-16 VITALS
OXYGEN SATURATION: 100 % | DIASTOLIC BLOOD PRESSURE: 78 MMHG | RESPIRATION RATE: 16 BRPM | TEMPERATURE: 98.5 F | WEIGHT: 145 LBS | SYSTOLIC BLOOD PRESSURE: 109 MMHG | BODY MASS INDEX: 22.05 KG/M2 | HEART RATE: 67 BPM

## 2025-08-16 DIAGNOSIS — K13.21 ORAL LEUKOPLAKIA: ICD-10-CM

## 2025-08-16 DIAGNOSIS — D64.9 ANEMIA, UNSPECIFIED TYPE: Primary | ICD-10-CM

## 2025-08-16 LAB
ALBUMIN SERPL-MCNC: 4.9 G/DL (ref 3.5–5.2)
ALP SERPL-CCNC: 80 U/L (ref 40–129)
ALT SERPL-CCNC: 12 U/L (ref 10–50)
AMPHET UR QL SCN: NEGATIVE
ANION GAP SERPL CALCULATED.3IONS-SCNC: 14 MMOL/L (ref 8–16)
AST SERPL-CCNC: 29 U/L (ref 10–50)
BARBITURATES UR QL SCN: NEGATIVE
BASOPHILS # BLD: 0 K/UL (ref 0–0.2)
BASOPHILS NFR BLD: 0.7 % (ref 0–1)
BENZODIAZ UR QL SCN: NEGATIVE
BILIRUB SERPL-MCNC: 0.5 MG/DL (ref 0.2–1.2)
BILIRUB UR QL STRIP: NEGATIVE
BUN SERPL-MCNC: 10 MG/DL (ref 6–20)
BUPRENORPHINE URINE: NEGATIVE
CALCIUM SERPL-MCNC: 9.8 MG/DL (ref 8.6–10)
CANNABINOIDS UR QL SCN: NEGATIVE
CHLORIDE SERPL-SCNC: 101 MMOL/L (ref 98–107)
CLARITY UR: CLEAR
CO2 SERPL-SCNC: 25 MMOL/L (ref 22–29)
COCAINE UR QL SCN: NEGATIVE
COLOR UR: YELLOW
CREAT SERPL-MCNC: 0.8 MG/DL (ref 0.7–1.2)
DRUG SCREEN COMMENT UR-IMP: NORMAL
EOSINOPHIL # BLD: 0.2 K/UL (ref 0–0.6)
EOSINOPHIL NFR BLD: 2.9 % (ref 0–5)
ERYTHROCYTE [DISTWIDTH] IN BLOOD BY AUTOMATED COUNT: 13.6 % (ref 11.5–14.5)
ETHANOLAMINE SERPL-MCNC: <11 MG/DL (ref 0–11)
FENTANYL SCREEN, URINE: NEGATIVE
GLUCOSE SERPL-MCNC: 112 MG/DL (ref 70–99)
GLUCOSE UR STRIP.AUTO-MCNC: NEGATIVE MG/DL
HCT VFR BLD AUTO: 29.4 % (ref 42–52)
HGB BLD-MCNC: 9.7 G/DL (ref 14–18)
HGB UR STRIP.AUTO-MCNC: NEGATIVE MG/L
IMM GRANULOCYTES # BLD: 0 K/UL
KETONES UR STRIP.AUTO-MCNC: NEGATIVE MG/DL
LEUKOCYTE ESTERASE UR QL STRIP.AUTO: NEGATIVE
LYMPHOCYTES # BLD: 2.5 K/UL (ref 1.1–4.5)
LYMPHOCYTES NFR BLD: 42.8 % (ref 20–40)
MCH RBC QN AUTO: 27.7 PG (ref 27–31)
MCHC RBC AUTO-ENTMCNC: 33 G/DL (ref 33–37)
MCV RBC AUTO: 84 FL (ref 80–94)
METHADONE UR QL SCN: NEGATIVE
METHAMPHETAMINE, URINE: NEGATIVE
MONOCYTES # BLD: 0.6 K/UL (ref 0–0.9)
MONOCYTES NFR BLD: 9.5 % (ref 0–10)
NEUTROPHILS # BLD: 2.6 K/UL (ref 1.5–7.5)
NEUTS SEG NFR BLD: 43.9 % (ref 50–65)
NITRITE UR QL STRIP.AUTO: NEGATIVE
OPIATES UR QL SCN: NEGATIVE
OXYCODONE UR QL SCN: NEGATIVE
PCP UR QL SCN: NEGATIVE
PH UR STRIP.AUTO: 7 [PH] (ref 5–8)
PLATELET # BLD AUTO: 289 K/UL (ref 130–400)
PMV BLD AUTO: 9.4 FL (ref 9.4–12.4)
POTASSIUM SERPL-SCNC: 3.9 MMOL/L (ref 3.5–5)
PROT SERPL-MCNC: 8.1 G/DL (ref 6.4–8.3)
PROT UR STRIP.AUTO-MCNC: NEGATIVE MG/DL
RBC # BLD AUTO: 3.5 M/UL (ref 4.7–6.1)
SODIUM SERPL-SCNC: 140 MMOL/L (ref 136–145)
SP GR UR STRIP.AUTO: 1 (ref 1–1.03)
TRICYCLIC ANTIDEPRESSANTS, UR: NEGATIVE
UROBILINOGEN UR STRIP.AUTO-MCNC: 0.2 E.U./DL
WBC # BLD AUTO: 5.8 K/UL (ref 4.8–10.8)

## 2025-08-16 PROCEDURE — 36415 COLL VENOUS BLD VENIPUNCTURE: CPT

## 2025-08-16 PROCEDURE — 81003 URINALYSIS AUTO W/O SCOPE: CPT

## 2025-08-16 PROCEDURE — G0480 DRUG TEST DEF 1-7 CLASSES: HCPCS

## 2025-08-16 PROCEDURE — 82077 ASSAY SPEC XCP UR&BREATH IA: CPT

## 2025-08-16 PROCEDURE — 93005 ELECTROCARDIOGRAM TRACING: CPT | Performed by: PHYSICIAN ASSISTANT

## 2025-08-16 PROCEDURE — 80053 COMPREHEN METABOLIC PANEL: CPT

## 2025-08-16 PROCEDURE — 99284 EMERGENCY DEPT VISIT MOD MDM: CPT

## 2025-08-16 PROCEDURE — 80307 DRUG TEST PRSMV CHEM ANLYZR: CPT

## 2025-08-16 PROCEDURE — 85025 COMPLETE CBC W/AUTO DIFF WBC: CPT

## 2025-08-16 RX ORDER — NYSTATIN 100000 [USP'U]/ML
500000 SUSPENSION ORAL 4 TIMES DAILY
Qty: 60 ML | Refills: 0 | Status: SHIPPED | OUTPATIENT
Start: 2025-08-16

## 2025-08-16 ASSESSMENT — ENCOUNTER SYMPTOMS
SHORTNESS OF BREATH: 0
COUGH: 0
BACK PAIN: 0
COLOR CHANGE: 0
EYE ITCHING: 0
EYE DISCHARGE: 0
PHOTOPHOBIA: 0
APNEA: 0

## 2025-08-16 ASSESSMENT — PAIN SCALES - GENERAL: PAINLEVEL_OUTOF10: 0

## 2025-08-16 ASSESSMENT — PAIN - FUNCTIONAL ASSESSMENT: PAIN_FUNCTIONAL_ASSESSMENT: 0-10

## 2025-08-19 LAB
EKG P AXIS: 56 DEGREES
EKG P-R INTERVAL: 136 MS
EKG Q-T INTERVAL: 374 MS
EKG QRS DURATION: 78 MS
EKG QTC CALCULATION (BAZETT): 375 MS
EKG T AXIS: 65 DEGREES

## 2025-08-19 PROCEDURE — 93010 ELECTROCARDIOGRAM REPORT: CPT | Performed by: INTERNAL MEDICINE

## (undated) DEVICE — STANDARD HYPODERMIC NEEDLE,POLYPROPYLENE HUB: Brand: MONOJECT

## (undated) DEVICE — ADHESIVE SKIN CLOSURE WND 8.661X1.5 IN 22 CM LIQUIBAND SECUR

## (undated) DEVICE — STERILE LATEX POWDER FREE SURGICAL GLOVES WITH HYDROGEL COATING: Brand: PROTEXIS

## (undated) DEVICE — SUTURE VCRL SZ 0 L27IN ABSRB UD L36MM CT-1 1/2 CIR J260H

## (undated) DEVICE — SUTURE PERMAHAND SZ 0 L30IN NONABSORBABLE BLK SILK BRAID A306H

## (undated) DEVICE — TOWEL,OR,DSP,ST,BLUE,DLX,4/PK,20PK/CS: Brand: MEDLINE

## (undated) DEVICE — PAD,ABDOMINAL,8"X10",ST,LF: Brand: MEDLINE

## (undated) DEVICE — BANDAGE,GAUZE,CONFORMING,4"X75",STRL,LF: Brand: MEDLINE

## (undated) DEVICE — PACK,UNIVERSAL,NO GOWNS: Brand: MEDLINE

## (undated) DEVICE — SUTURE PERMAHAND SZ 2-0 L30IN NONABSORBABLE BLK SILK W/O A305H

## (undated) DEVICE — SUTURE CHROMIC GUT SZ 3-0 L36IN ABSRB BRN L26MM SH 1/2 CIR G172H

## (undated) DEVICE — SUTURE VCRL SZ 2-0 L36IN ABSRB UD L36MM CT-1 1/2 CIR J945H

## (undated) DEVICE — SUTURE CHROMIC GUT SZ 2-0 L27IN ABSRB BRN L36MM CT-1 1/2 811H

## (undated) DEVICE — SUTURE MCRYL SZ 4-0 L18IN ABSRB UD L19MM PS-2 3/8 CIR PRIM Y496G

## (undated) DEVICE — PENROSE DRAIN 18 X .5" SILICONE: Brand: MEDLINE

## (undated) DEVICE — SUTURE PERMAHAND SZ 2-0 L30IN NONABSORBABLE BLK SH L26MM C016D

## (undated) DEVICE — SUTURE PERMA-HAND SZ 2-0 L30IN NONABSORBABLE BLK L26MM SH K833H

## (undated) DEVICE — SPONGE SURG WHT KTNR DISECT RADPQ ST

## (undated) DEVICE — SUTURE CHROMIC GUT SZ 2-0 L36IN ABSRB BRN SH L26MM 1/2 CIR G173H

## (undated) DEVICE — MINOR CDS: Brand: MEDLINE INDUSTRIES, INC.